# Patient Record
Sex: FEMALE | Race: WHITE | Employment: PART TIME | URBAN - METROPOLITAN AREA
[De-identification: names, ages, dates, MRNs, and addresses within clinical notes are randomized per-mention and may not be internally consistent; named-entity substitution may affect disease eponyms.]

---

## 2019-08-20 ENCOUNTER — HOSPITAL ENCOUNTER (OUTPATIENT)
Dept: ULTRASOUND IMAGING | Age: 29
Discharge: HOME | End: 2019-08-20
Attending: EMERGENCY MEDICINE

## 2019-08-20 ENCOUNTER — HOSPITAL ENCOUNTER (EMERGENCY)
Age: 29
Discharge: HOME | End: 2019-08-21
Attending: EMERGENCY MEDICINE | Admitting: INTERNAL MEDICINE
Payer: SUBSIDIZED

## 2019-08-20 ENCOUNTER — APPOINTMENT (OUTPATIENT)
Dept: ULTRASOUND IMAGING | Age: 29
End: 2019-08-20
Attending: EMERGENCY MEDICINE
Payer: SUBSIDIZED

## 2019-08-20 DIAGNOSIS — N61.1 BREAST ABSCESS: Primary | ICD-10-CM

## 2019-08-20 DIAGNOSIS — N61.0 CELLULITIS OF BREAST: ICD-10-CM

## 2019-08-20 PROBLEM — R65.10 SIRS (SYSTEMIC INFLAMMATORY RESPONSE SYNDROME) (HCC): Status: ACTIVE | Noted: 2019-08-20

## 2019-08-20 LAB
ALBUMIN SERPL-MCNC: 3.4 G/DL (ref 3.5–5)
ALBUMIN/GLOB SERPL: 0.9 {RATIO} (ref 1.1–2.2)
ALP SERPL-CCNC: 105 U/L (ref 45–117)
ALT SERPL-CCNC: 13 U/L (ref 12–78)
ANION GAP SERPL CALC-SCNC: 6 MMOL/L (ref 5–15)
AST SERPL-CCNC: 10 U/L (ref 15–37)
BASOPHILS # BLD: 0.1 K/UL (ref 0–0.1)
BASOPHILS NFR BLD: 0 % (ref 0–1)
BILIRUB SERPL-MCNC: 0.2 MG/DL (ref 0.2–1)
BUN SERPL-MCNC: 19 MG/DL (ref 6–20)
BUN/CREAT SERPL: 24 (ref 12–20)
CALCIUM SERPL-MCNC: 8.7 MG/DL (ref 8.5–10.1)
CHLORIDE SERPL-SCNC: 106 MMOL/L (ref 97–108)
CO2 SERPL-SCNC: 26 MMOL/L (ref 21–32)
COMMENT, HOLDF: NORMAL
COMMENT, HOLDF: NORMAL
CREAT SERPL-MCNC: 0.8 MG/DL (ref 0.55–1.02)
DIFFERENTIAL METHOD BLD: ABNORMAL
EOSINOPHIL # BLD: 0.3 K/UL (ref 0–0.4)
EOSINOPHIL NFR BLD: 2 % (ref 0–7)
ERYTHROCYTE [DISTWIDTH] IN BLOOD BY AUTOMATED COUNT: 12.9 % (ref 11.5–14.5)
GLOBULIN SER CALC-MCNC: 3.9 G/DL (ref 2–4)
GLUCOSE SERPL-MCNC: 103 MG/DL (ref 65–100)
HCT VFR BLD AUTO: 37.1 % (ref 35–47)
HGB BLD-MCNC: 12 G/DL (ref 11.5–16)
IMM GRANULOCYTES # BLD AUTO: 0.1 K/UL (ref 0–0.04)
IMM GRANULOCYTES NFR BLD AUTO: 1 % (ref 0–0.5)
LYMPHOCYTES # BLD: 2 K/UL (ref 0.8–3.5)
LYMPHOCYTES NFR BLD: 14 % (ref 12–49)
MCH RBC QN AUTO: 28.6 PG (ref 26–34)
MCHC RBC AUTO-ENTMCNC: 32.3 G/DL (ref 30–36.5)
MCV RBC AUTO: 88.5 FL (ref 80–99)
MONOCYTES # BLD: 1.1 K/UL (ref 0–1)
MONOCYTES NFR BLD: 8 % (ref 5–13)
NEUTS SEG # BLD: 10.7 K/UL (ref 1.8–8)
NEUTS SEG NFR BLD: 75 % (ref 32–75)
NRBC # BLD: 0 K/UL (ref 0–0.01)
NRBC BLD-RTO: 0 PER 100 WBC
PLATELET # BLD AUTO: 319 K/UL (ref 150–400)
PMV BLD AUTO: 11.3 FL (ref 8.9–12.9)
POTASSIUM SERPL-SCNC: 3.8 MMOL/L (ref 3.5–5.1)
PROT SERPL-MCNC: 7.3 G/DL (ref 6.4–8.2)
RBC # BLD AUTO: 4.19 M/UL (ref 3.8–5.2)
SAMPLES BEING HELD,HOLD: NORMAL
SAMPLES BEING HELD,HOLD: NORMAL
SODIUM SERPL-SCNC: 138 MMOL/L (ref 136–145)
WBC # BLD AUTO: 14.3 K/UL (ref 3.6–11)

## 2019-08-20 PROCEDURE — 76642 ULTRASOUND BREAST LIMITED: CPT

## 2019-08-20 PROCEDURE — 74011250636 HC RX REV CODE- 250/636: Performed by: EMERGENCY MEDICINE

## 2019-08-20 PROCEDURE — 80053 COMPREHEN METABOLIC PANEL: CPT

## 2019-08-20 PROCEDURE — 99284 EMERGENCY DEPT VISIT MOD MDM: CPT

## 2019-08-20 PROCEDURE — 74011250636 HC RX REV CODE- 250/636: Performed by: PHYSICIAN ASSISTANT

## 2019-08-20 PROCEDURE — 96374 THER/PROPH/DIAG INJ IV PUSH: CPT

## 2019-08-20 PROCEDURE — 87186 SC STD MICRODIL/AGAR DIL: CPT

## 2019-08-20 PROCEDURE — 87185 SC STD ENZYME DETCJ PER NZM: CPT

## 2019-08-20 PROCEDURE — 87040 BLOOD CULTURE FOR BACTERIA: CPT

## 2019-08-20 PROCEDURE — 76604 US EXAM CHEST: CPT

## 2019-08-20 PROCEDURE — 85025 COMPLETE CBC W/AUTO DIFF WBC: CPT

## 2019-08-20 PROCEDURE — 87153 DNA/RNA SEQUENCING: CPT

## 2019-08-20 PROCEDURE — 87205 SMEAR GRAM STAIN: CPT

## 2019-08-20 PROCEDURE — 87077 CULTURE AEROBIC IDENTIFY: CPT

## 2019-08-20 PROCEDURE — 74011000258 HC RX REV CODE- 258: Performed by: PHYSICIAN ASSISTANT

## 2019-08-20 PROCEDURE — 36415 COLL VENOUS BLD VENIPUNCTURE: CPT

## 2019-08-20 PROCEDURE — 96375 TX/PRO/DX INJ NEW DRUG ADDON: CPT

## 2019-08-20 PROCEDURE — 83605 ASSAY OF LACTIC ACID: CPT

## 2019-08-20 PROCEDURE — 65270000029 HC RM PRIVATE

## 2019-08-20 RX ORDER — SODIUM CHLORIDE 0.9 % (FLUSH) 0.9 %
5-40 SYRINGE (ML) INJECTION AS NEEDED
Status: DISCONTINUED | OUTPATIENT
Start: 2019-08-20 | End: 2019-08-20

## 2019-08-20 RX ORDER — ONDANSETRON 2 MG/ML
4 INJECTION INTRAMUSCULAR; INTRAVENOUS
Status: COMPLETED | OUTPATIENT
Start: 2019-08-20 | End: 2019-08-20

## 2019-08-20 RX ORDER — MORPHINE SULFATE 4 MG/ML
4 INJECTION INTRAVENOUS
Status: COMPLETED | OUTPATIENT
Start: 2019-08-20 | End: 2019-08-20

## 2019-08-20 RX ORDER — LIDOCAINE HYDROCHLORIDE 20 MG/ML
10 INJECTION, SOLUTION EPIDURAL; INFILTRATION; INTRACAUDAL; PERINEURAL
Status: COMPLETED | OUTPATIENT
Start: 2019-08-20 | End: 2019-08-20

## 2019-08-20 RX ORDER — CLINDAMYCIN HYDROCHLORIDE 300 MG/1
300 CAPSULE ORAL 4 TIMES DAILY
Qty: 28 CAP | Refills: 0 | Status: SHIPPED | OUTPATIENT
Start: 2019-08-20 | End: 2019-08-21 | Stop reason: ALTCHOICE

## 2019-08-20 RX ORDER — ENOXAPARIN SODIUM 100 MG/ML
40 INJECTION SUBCUTANEOUS EVERY 24 HOURS
Status: DISCONTINUED | OUTPATIENT
Start: 2019-08-20 | End: 2019-08-20

## 2019-08-20 RX ORDER — HYDROCODONE BITARTRATE AND ACETAMINOPHEN 5; 325 MG/1; MG/1
1 TABLET ORAL
Qty: 10 TAB | Refills: 0 | Status: SHIPPED | OUTPATIENT
Start: 2019-08-20 | End: 2019-08-23

## 2019-08-20 RX ORDER — ONDANSETRON 2 MG/ML
4 INJECTION INTRAMUSCULAR; INTRAVENOUS
Status: DISCONTINUED | OUTPATIENT
Start: 2019-08-20 | End: 2019-08-20

## 2019-08-20 RX ORDER — NALOXONE HYDROCHLORIDE 0.4 MG/ML
0.4 INJECTION, SOLUTION INTRAMUSCULAR; INTRAVENOUS; SUBCUTANEOUS AS NEEDED
Status: DISCONTINUED | OUTPATIENT
Start: 2019-08-20 | End: 2019-08-20

## 2019-08-20 RX ORDER — SODIUM CHLORIDE 0.9 % (FLUSH) 0.9 %
5-40 SYRINGE (ML) INJECTION EVERY 8 HOURS
Status: DISCONTINUED | OUTPATIENT
Start: 2019-08-20 | End: 2019-08-20

## 2019-08-20 RX ORDER — HYDROCODONE BITARTRATE AND ACETAMINOPHEN 5; 325 MG/1; MG/1
1 TABLET ORAL
Status: DISCONTINUED | OUTPATIENT
Start: 2019-08-20 | End: 2019-08-20

## 2019-08-20 RX ORDER — SODIUM CHLORIDE 9 MG/ML
150 INJECTION, SOLUTION INTRAVENOUS CONTINUOUS
Status: DISCONTINUED | OUTPATIENT
Start: 2019-08-20 | End: 2019-08-20

## 2019-08-20 RX ORDER — ACETAMINOPHEN 325 MG/1
650 TABLET ORAL
Status: DISCONTINUED | OUTPATIENT
Start: 2019-08-20 | End: 2019-08-20

## 2019-08-20 RX ADMIN — LIDOCAINE HYDROCHLORIDE 200 MG: 20 INJECTION, SOLUTION EPIDURAL; INFILTRATION; INTRACAUDAL; PERINEURAL at 22:28

## 2019-08-20 RX ADMIN — MORPHINE SULFATE 4 MG: 4 INJECTION, SOLUTION INTRAMUSCULAR; INTRAVENOUS at 20:28

## 2019-08-20 RX ADMIN — VANCOMYCIN HYDROCHLORIDE 1500 MG: 10 INJECTION, POWDER, LYOPHILIZED, FOR SOLUTION INTRAVENOUS at 23:41

## 2019-08-20 RX ADMIN — AMPICILLIN AND SULBACTAM 3 G: 2; 1 INJECTION, POWDER, FOR SOLUTION INTRAVENOUS at 22:25

## 2019-08-20 RX ADMIN — MORPHINE SULFATE 4 MG: 4 INJECTION, SOLUTION INTRAMUSCULAR; INTRAVENOUS at 22:23

## 2019-08-20 RX ADMIN — ONDANSETRON 4 MG: 2 INJECTION INTRAMUSCULAR; INTRAVENOUS at 20:28

## 2019-08-20 RX ADMIN — SODIUM CHLORIDE 1000 ML: 900 INJECTION, SOLUTION INTRAVENOUS at 20:29

## 2019-08-20 NOTE — ED PROVIDER NOTES
I have evaluated the patient as the Provider in Triage. I have reviewed Her vital signs and the triage nurse assessment. I have talked with the patient and any available family and advised that I am the provider in triage and have ordered the appropriate study to initiate their work up based on the clinical presentation during my assessment. I have advised that the patient will be accommodated in the Main ED as soon as possible. I have also requested to contact the triage nurse or myself immediately if the patient experiences any changes in their condition during this brief waiting period. 34 y.o. female with no significant past medical history who presents from private vehicle with chief complaint of skin problem. Pt reports swelling, pain, redness, warmth, and drainage to the right breast since 08/14/19. Pt notes she is taking left over bactrim that she was prescribed for a previous infection. She reports taking ibuprofen for pain. Pt denies recent breast feeding. Pt denies chills or N/V. There are no other acute medical concerns at this time. Note written by Valentín Khan, as dictated by Asa Dixon MD 5:16 PM        Agree with above HPI  JAQUELINE Oseguera      The history is provided by the patient and a parent. No  was used. No past medical history on file. No past surgical history on file. No family history on file.     Social History     Socioeconomic History    Marital status: SINGLE     Spouse name: Not on file    Number of children: Not on file    Years of education: Not on file    Highest education level: Not on file   Occupational History    Not on file   Social Needs    Financial resource strain: Not on file    Food insecurity:     Worry: Not on file     Inability: Not on file    Transportation needs:     Medical: Not on file     Non-medical: Not on file   Tobacco Use    Smoking status: Not on file   Substance and Sexual Activity    Alcohol use: Not on file    Drug use: Not on file    Sexual activity: Not on file   Lifestyle    Physical activity:     Days per week: Not on file     Minutes per session: Not on file    Stress: Not on file   Relationships    Social connections:     Talks on phone: Not on file     Gets together: Not on file     Attends [employer] service: Not on file     Active member of club or organization: Not on file     Attends meetings of clubs or organizations: Not on file     Relationship status: Not on file    Intimate partner violence:     Fear of current or ex partner: Not on file     Emotionally abused: Not on file     Physically abused: Not on file     Forced sexual activity: Not on file   Other Topics Concern    Not on file   Social History Narrative    Not on file         ALLERGIES: Patient has no known allergies. Review of Systems   Constitutional: Negative for activity change, chills and fever. HENT: Negative for congestion, rhinorrhea and sore throat. Respiratory: Negative for shortness of breath. Cardiovascular: Negative for leg swelling. Gastrointestinal: Negative for abdominal pain, diarrhea, nausea and vomiting. Genitourinary: Negative for dysuria, vaginal bleeding and vaginal discharge. Musculoskeletal: Negative for arthralgias and myalgias. Skin: Positive for color change and wound. Neurological: Negative for dizziness. Psychiatric/Behavioral: Negative for confusion. All other systems reviewed and are negative. There were no vitals filed for this visit. Physical Exam   Constitutional: She is oriented to person, place, and time. She appears well-developed. HENT:   Head: Normocephalic and atraumatic. Right Ear: External ear normal.   Left Ear: External ear normal.   Nose: Nose normal.   Mouth/Throat: Oropharynx is clear and moist. No oropharyngeal exudate. Eyes: Conjunctivae, EOM and lids are normal. Right eye exhibits no discharge. Left eye exhibits no discharge. Neck: Normal range of motion. No tracheal deviation present. No thyromegaly present. Cardiovascular: Normal rate, regular rhythm, normal heart sounds and intact distal pulses. Pulmonary/Chest: Effort normal and breath sounds normal. Right breast exhibits skin change and tenderness. Right breast exhibits no inverted nipple and no nipple discharge. There is breast swelling and tenderness. Abdominal: Soft. Normal appearance and bowel sounds are normal.   Musculoskeletal: Normal range of motion. Neurological: She is alert and oriented to person, place, and time. Skin: Skin is warm and dry. Psychiatric: She has a normal mood and affect. Judgment normal.        MDM  Number of Diagnoses or Management Options  Breast abscess:   Cellulitis of breast:   Diagnosis management comments: Assesment/Plan- 34 y.o. Patient presents with:  Skin Problem  differential includes: abscess, cellulitis, malignancy. Labs and imaging reviewed with leukocytosis, US showing large cystic fluid collection. Patient is well appearing, afebrile and tolerating PO. Recommend breast surgery follow up. Patient educated on reasons to return to the ED. I&D Abcess Complex  Date/Time: 8/23/2019 9:06 AM  Performed by: JAQUELINE Culver  Authorized by: JAQUELINE Culver     Consent:     Consent obtained:  Verbal    Consent given by:  Patient    Risks discussed:  Bleeding, incomplete drainage, pain and infection    Alternatives discussed:  No treatment  Location:     Type:  Abscess    Size:  4cm    Location:  Trunk    Trunk location:  R breast  Pre-procedure details:     Skin preparation:  Antiseptic wash  Anesthesia (see MAR for exact dosages):      Anesthesia method:  Local infiltration    Local anesthetic:  Lidocaine 2% w/o epi  Procedure type:     Complexity:  Complex  Procedure details:     Needle aspiration: yes      Needle size:  25 G    Incision types:  Single straight    Incision depth:  Dermal    Scalpel blade:  11    Wound management:  Probed and deloculated    Drainage:  Purulent    Drainage amount:  Copious    Wound treatment:  Wound left open    Packing materials:  1/2 in gauze    Amount 1/2\":  4  Post-procedure details:     Patient tolerance of procedure: Tolerated well, no immediate complications          CONSULT NOTE:   Grupo Lindquist PA-C spoke with Dr. Jo-Ann Lyons,   Specialty: Breast surgery  Discussed pt's hx, disposition, and available diagnostic and imaging results. Reviewed care plans. Consultant agrees with plans as outlined. Recommended performing an I and D in ED, Discharge home with antibiotics and can be seen in the office in the morning.

## 2019-08-20 NOTE — ED TRIAGE NOTES
Pt presents with redness, swelling, drainage and pain to her right breast since approximately 8/14. Pt also reports fevers.

## 2019-08-21 ENCOUNTER — OFFICE VISIT (OUTPATIENT)
Dept: SURGERY | Age: 29
End: 2019-08-21

## 2019-08-21 ENCOUNTER — DOCUMENTATION ONLY (OUTPATIENT)
Dept: SURGERY | Age: 29
End: 2019-08-21

## 2019-08-21 VITALS
HEART RATE: 94 BPM | WEIGHT: 140 LBS | BODY MASS INDEX: 23.9 KG/M2 | TEMPERATURE: 97.9 F | HEIGHT: 64 IN | SYSTOLIC BLOOD PRESSURE: 117 MMHG | DIASTOLIC BLOOD PRESSURE: 75 MMHG | RESPIRATION RATE: 18 BRPM | OXYGEN SATURATION: 96 %

## 2019-08-21 VITALS
BODY MASS INDEX: 23.9 KG/M2 | HEART RATE: 86 BPM | HEIGHT: 64 IN | WEIGHT: 140 LBS | DIASTOLIC BLOOD PRESSURE: 37 MMHG | SYSTOLIC BLOOD PRESSURE: 105 MMHG

## 2019-08-21 DIAGNOSIS — N61.1 ABSCESS OF BREAST, RIGHT: Primary | ICD-10-CM

## 2019-08-21 LAB — LACTATE BLD-SCNC: 0.68 MMOL/L (ref 0.4–2)

## 2019-08-21 RX ORDER — AMOXICILLIN AND CLAVULANATE POTASSIUM 875; 125 MG/1; MG/1
1 TABLET, FILM COATED ORAL 2 TIMES DAILY
Qty: 28 TAB | Refills: 1 | Status: SHIPPED | OUTPATIENT
Start: 2019-08-21 | End: 2022-06-02

## 2019-08-21 NOTE — PROGRESS NOTES
[facility]  OFFICE PROCEDURE PROGRESS NOTE        Chart reviewed for the following:   Cat Rogers MD, have reviewed the History, Physical and updated the Allergic reactions for 1990 [facility] performed immediately prior to start of procedure:   Cat Rogers MD, have performed the following reviews on [facility] prior to the start of the procedure:            * Patient was identified by name and date of birth   * Agreement on procedure being performed was verified  * Risks and Benefits explained to the patient  * Procedure site verified and marked as necessary  * Patient was positioned for comfort  * Consent was signed and verified     Time:  10:50 am      Date of procedure: 8/21/2019    Procedure performed by:  Darron Carmen MD    Provider assisted by:  Jay Morrow RN    Patient assisted by: self    How tolerated by patient: tolerated the procedure well with no complications    Post Procedural Pain Scale: 2 - Hurts Little Bit    Comments: none

## 2019-08-21 NOTE — PROGRESS NOTES
HISTORY OF PRESENT ILLNESS  Abi Ivan is a 34 y.o. female. HPI ESTABLISHED patient here for follow-up of RIGHT breast abscess. She was seen in ED on 6/10/21 and by Dr. Farhat Bernard on 7/69/94. She did have packing change yesterday. Breast was reddened where tape was and used a stretchy mesh to keep ABD pad on, and redness from tape much improved. She states it it more tender than yesterday and drainage has decreased. There is still redness, swelling and some warmth under the under the breast. On day 2 of antibiotics. Does have to take hydrocodone for pain occasionally. Menarche 16, LMP 8/19/19, # of children 0, age of 1st delivery N/A, Hysterectomy/oophorectomy No/No  Breast bx No, history of breast feeding No, BCP Yes, in the past Hormone therapy No    US Results (most recent):  Results from Hospital Encounter encounter on 08/20/19   US BREAST RT LIMITED=<3 QUAD    Narrative STUDY:  Right breast ultrasound    INDICATION: Swelling, pain, redness, warmth, and drainage to the right breast  for one week    COMPARISON:  None    FINDINGS: Right breast ultrasound was performed, while the patient was in the  emergency department. There is diffuse skin thickening. Immediately deep to the  skin, there is a complex cystic mass, which measures 5.7 x 5.4 x 3.1 cm, and  appears to extend to the skin surface. There is no definite internal  vascularity. Per the technologist, this is located at 11:00, 3 cm from the  nipple. Impression IMPRESSION:    1. Complex cystic mass in the superficial aspect of the right breast at 11:00,  measuring 5.7 x 5.4 x 3.1 cm. This may represent a breast abscess in the  appropriate clinical scenario. Other etiologies to consider would include a  necrotic breast cancer. Continued antibiotic coverage is recommended. Outpatient  breast surgical consultation is recommended. Follow-up in a dedicated breast  center as an outpatient is recommended.          ROS    Physical Exam Pulmonary/Chest:         Visit Vitals  /60   Pulse 60   Ht 5' 4\" (1.626 m)   Wt 140 lb (63.5 kg)   LMP 08/19/2019 (Exact Date)   BMI 24.03 kg/m²     ASSESSMENT and PLAN  Encounter Diagnoses   Name Primary?  Abscess of breast, right Yes     Packing changed without issue. Use saline to loosen gauze dressing. Patient had a reaction to tape and her whole breast was red, but this is improving. Mesh band used to whole bandage in place. Abscess healing, but skin immediately surrounding incision is thin and friable. Used nonstick pad for dressing and the guaze was sticking and pulling off skin. Follow-up on Monday for packing change - patient is unsure if she could change packing herself at home. Continue on antibiotics. Encouraged smoking cessation. RTC on Monday for packing change. After that should follow-up on Wednesday for packing change (unless she can change the packing at home) and then with Dr. Nakia Donato on Friday. She is comfortable with this plan. All questions answered and she stated understanding.

## 2019-08-21 NOTE — PROGRESS NOTES
HISTORY OF PRESENT ILLNESS  Toshia Guzman is a 34 y.o. female. HPI   NEW patient presents for consultation at the request of [facility] ER for RIGHT breast abscess. Was seen there last night and had an I and D and was given IV Vancomycin. Was discharged on pain medication and Dicloxacillin, which she has not started yet. She says that she has had a skin lesion on her breast for about 10 years which flares up and drains thick white material occasionally. This resolves and she is left with a lesion that looks a lot like a pimple with a white head. This pattern has repeated itself for many years. Last week she noticed swelling, redness and pain of the RIGHT breast along with thick white drainage. Her mom gave her some leftover Bactrim, but it did not improve. Presented to the ER last night after she got considerably worse. There is no FH of breast or ovarian cancer. US Results (most recent):  Results from [place] encounter on 08/20/19   US BREAST RT LIMITED=<3 QUAD    Narrative STUDY:  Right breast ultrasound    INDICATION: Swelling, pain, redness, warmth, and drainage to the right breast  for one week    COMPARISON:  None    FINDINGS: Right breast ultrasound was performed, while the patient was in the  emergency department. There is diffuse skin thickening. Immediately deep to the  skin, there is a complex cystic mass, which measures 5.7 x 5.4 x 3.1 cm, and  appears to extend to the skin surface. There is no definite internal  vascularity. Per the technologist, this is located at 11:00, 3 cm from the  nipple. Impression IMPRESSION:    1. Complex cystic mass in the superficial aspect of the right breast at 11:00,  measuring 5.7 x 5.4 x 3.1 cm. This may represent a breast abscess in the  appropriate clinical scenario. Other etiologies to consider would include a  necrotic breast cancer. Continued antibiotic coverage is recommended.  Outpatient  breast surgical consultation is recommended. Follow-up in a dedicated breast  center as an outpatient is recommended. Review of Systems   Constitutional: Negative. HENT: Negative. Eyes: Negative. Respiratory: Negative. Cardiovascular: Negative. Gastrointestinal: Positive for constipation. Genitourinary: Negative. Musculoskeletal: Negative. Skin: Negative. Neurological: Negative. Endo/Heme/Allergies: Negative. Psychiatric/Behavioral: The patient is nervous/anxious.         Physical Exam    ASSESSMENT and PLAN  {ASSESSMENT/PLAN:44031}

## 2019-08-21 NOTE — PROGRESS NOTES
HISTORY OF PRESENT ILLNESS  Le Thomas is a 34 y.o. female. HPI  NEW patient presents for consultation at the request of [facility] ER for RIGHT breast abscess. Was seen there last night and had an I and D and was given IV Vancomycin. Was discharged on pain medication and Dicloxacillin, which she has not started yet. She says that she has had a skin lesion on her breast for about 10 years which flares up and drains thick white material occasionally. This resolves and she is left with a lesion that looks a lot like a pimple with a white head. This pattern has repeated itself for many years. Last week she noticed swelling, redness and pain of the RIGHT breast along with thick white drainage. Her mom gave her some leftover Bactrim, but it did not improve. Presented to the ER last night after she got considerably worse. There is no FH of breast or ovarian cancer. US Results (most recent):       Results from [place] encounter on 08/20/19   US BREAST RT LIMITED=<3 QUAD     Narrative STUDY:  Right breast ultrasound     INDICATION: Swelling, pain, redness, warmth, and drainage to the right breast  for one week     COMPARISON:  None     FINDINGS: Right breast ultrasound was performed, while the patient was in the  emergency department. There is diffuse skin thickening. Immediately deep to the  skin, there is a complex cystic mass, which measures 5.7 x 5.4 x 3.1 cm, and  appears to extend to the skin surface. There is no definite internal  vascularity. Per the technologist, this is located at 11:00, 3 cm from the  nipple.        Impression IMPRESSION:     1. Complex cystic mass in the superficial aspect of the right breast at 11:00,  measuring 5.7 x 5.4 x 3.1 cm. This may represent a breast abscess in the  appropriate clinical scenario. Other etiologies to consider would include a  necrotic breast cancer. Continued antibiotic coverage is recommended.  Outpatient  breast surgical consultation is recommended. Follow-up in a dedicated breast  center as an outpatient is recommended.           No past medical history on file. No past surgical history on file. Social History     Socioeconomic History    Marital status: SINGLE     Spouse name: Not on file    Number of children: Not on file    Years of education: Not on file    Highest education level: Not on file   Occupational History    Not on file   Social Needs    Financial resource strain: Not on file    Food insecurity:     Worry: Not on file     Inability: Not on file    Transportation needs:     Medical: Not on file     Non-medical: Not on file   Tobacco Use    Smoking status: Current Every Day Smoker     Packs/day: 1.00    Smokeless tobacco: Never Used   Substance and Sexual Activity    Alcohol use: Yes     Alcohol/week: 6.0 standard drinks     Types: 6 Cans of beer per week    Drug use: Not on file    Sexual activity: Not on file   Lifestyle    Physical activity:     Days per week: Not on file     Minutes per session: Not on file    Stress: Not on file   Relationships    Social connections:     Talks on phone: Not on file     Gets together: Not on file     Attends Jew service: Not on file     Active member of club or organization: Not on file     Attends meetings of clubs or organizations: Not on file     Relationship status: Not on file    Intimate partner violence:     Fear of current or ex partner: Not on file     Emotionally abused: Not on file     Physically abused: Not on file     Forced sexual activity: Not on file   Other Topics Concern    Not on file   Social History Narrative    Not on file       Current Outpatient Medications on File Prior to Visit   Medication Sig Dispense Refill    HYDROcodone-acetaminophen (NORCO) 5-325 mg per tablet Take 1 Tab by mouth every four (4) hours as needed for Pain for up to 3 days. Max Daily Amount: 6 Tabs.  10 Tab 0     Current Facility-Administered Medications on File Prior to Visit   Medication Dose Route Frequency Provider Last Rate Last Dose    [COMPLETED] sodium chloride 0.9 % bolus infusion 1,000 mL  1,000 mL IntraVENous ONCE Rin Barrow MD   Stopped at 08/21/19 0412    [COMPLETED] morphine injection 4 mg  4 mg IntraVENous NOW Kamar Lindquist PA   4 mg at 08/20/19 2028    [COMPLETED] ondansetron (ZOFRAN) injection 4 mg  4 mg IntraVENous NOW Kamar Lindquist PA   4 mg at 08/20/19 2028    [COMPLETED] vancomycin (VANCOCIN) 1,500 mg in 0.9% sodium chloride 500 mL IVPB  1,500 mg IntraVENous ONCE Dev Lindquist [place]   Stopped at 08/21/19 0111    [COMPLETED] lidocaine (PF) (XYLOCAINE) 20 mg/mL (2 %) injection 200 mg  10 mL IntraDERMal NOW Kamar Lindquist PA   200 mg at 08/20/19 2228    [COMPLETED] ampicillin-sulbactam (UNASYN) 3 g in 0.9% sodium chloride (MBP/ADV) 100 mL  3 g IntraVENous NOW JAQUELINE Hilton   Stopped at 08/21/19 0412    [COMPLETED] morphine injection 4 mg  4 mg IntraVENous NOW Kamar Lindquist PA   4 mg at 08/20/19 2223    [DISCONTINUED] ampicillin-sulbactam (UNASYN) 3 g in 0.9% sodium chloride (MBP/ADV) 100 mL  3 g IntraVENous Q8H Huey Jerry MD        [DISCONTINUED] 0.9% sodium chloride infusion  150 mL/hr IntraVENous CONTINUOUS Huey Jerry MD        [DISCONTINUED] sodium chloride (NS) flush 5-40 mL  5-40 mL IntraVENous Q8H Huey Jerry MD        [DISCONTINUED] sodium chloride (NS) flush 5-40 mL  5-40 mL IntraVENous PRN Huey Jerry MD        [DISCONTINUED] acetaminophen (TYLENOL) tablet 650 mg  650 mg Oral Q4H PRN Huey Jerry MD        [DISCONTINUED] HYDROcodone-acetaminophen (NORCO) 5-325 mg per tablet 1 Tab  1 Tab Oral Q4H PRN Huey Jerry MD        [DISCONTINUED] naloxone [facility]) injection 0.4 mg  0.4 mg IntraVENous PRN Ramesh Rose MD        [DISCONTINUED] ondansetron [facility]) injection 4 mg  4 mg IntraVENous Q4H PRN Ramesh Rose MD        [DISCONTINUED] enoxaparin (LOVENOX) injection 40 mg  40 mg SubCUTAneous Q24H Dobransky, Nehemiah Cogan, MD        [DISCONTINUED] ADDaptor                No Known Allergies    OB History    None      Obstetric Comments   Menarche 16, LMP 8/19/19, # of children 0, age of 1st delivery N/A, Hysterectomy/oophorectomy No/No  Breast bx No, history of breast feeding No, BCP Yes, in the past Hormone therapy No               ROS  Constitutional: Negative. HENT: Negative. Eyes: Negative. Respiratory: Negative. Cardiovascular: Negative. Gastrointestinal: Positive for constipation. Genitourinary: Negative. Musculoskeletal: Negative. Skin: Negative. Neurological: Negative. Endo/Heme/Allergies: Negative. Psychiatric/Behavioral: The patient is nervous/anxious. Physical Exam   Cardiovascular: Normal rate, regular rhythm and normal heart sounds. Pulmonary/Chest: Breath sounds normal. Right breast exhibits no inverted nipple, no mass, no nipple discharge, no skin change and no tenderness. Left breast exhibits no inverted nipple, no mass, no nipple discharge, no skin change and no tenderness. Breasts are symmetrical.       Lymphadenopathy:     She has no cervical adenopathy. Right cervical: No superficial cervical, no deep cervical and no posterior cervical adenopathy present. Left cervical: No superficial cervical, no deep cervical and no posterior cervical adenopathy present. She has no axillary adenopathy. Right axillary: No pectoral and no lateral adenopathy present. Left axillary: No pectoral and no lateral adenopathy present. I & D  Indication: RIGHT breast abscess  Prep: We cleaned the skin with alcohol. Anesthesia: We anesthetized with Xylocaine. Guidance: We used real-time ultrasound guidance. Technique: We made a nicking incision in the skin. Yield: This yielded 10cc of pus. Result: This substantially decompressed the swelling.   Dressing: Clean, dry dressing applied. Tolerance: The patient tolerated the procedure well, pain was decreased at the completion of the procedure. Disposition: The patient was sent home in stable condition. Follow up as noted in the Plan and Next Appointment. ASSESSMENT and PLAN    ICD-10-CM ICD-9-CM    1. Abscess of breast, right N61.1 611.0       New patient presents for evaluation of RIGHT breast abscess, and is doing well overall. Large retroareolar breast abscess, with necrotic area at lateral portion of areola. Discussed I&D and debridement, and pt agreed to proceed. Debrided and drained 10cc of pus. Moderate amount of necrotic tissue at base of abscess cavity. Packed by RN. Pt tolerated these procedures well. Recommend daily wound care and antibiotics. Prescription given for Augmentin. (Pt was given prescriptions at her ED visit for Hydrocodone and Clindamycin; she may fill her prescription for hydrocodone, but does not need to fill the Clindamycin.)    F/U with RN tomorrow, and with Loli Rios NP on Friday to re-pack. Pt should continue daily packing, and f/u with me on Friday 08/30. This plan was reviewed with the patient and patient agrees. All questions were answered.     Written by Janette Logan, as dictated by Dr. Zuleyka Clark MD.

## 2019-08-21 NOTE — PROGRESS NOTES
JAQUELINE Brooks, consulted pharmacy for Vancomycin dosing. I ordered 1500 mg IV dose to be given now, per protocol. Treating breast abscess, along with Unasyn. Pharmacy will follow for further orders.

## 2019-08-21 NOTE — LETTER
8/22/2019 4:22 PM 
 
Patient:  Javier Heard YOB: 1990 Date of Visit: 8/21/2019 Dear Dr. Anup Uriostegui: Thank you for referring Ms. Salma Gaston to me for evaluation/treatment. Below are the relevant portions of my assessment and plan of care. HISTORY OF PRESENT ILLNESS Javier Heard is a 34 y.o. female. HPI 
NEW patient presents for consultation at the request of [facility] for RIGHT breast abscess. Was seen there last night and had an I and D and was given IV Vancomycin. Was discharged on pain medication and Dicloxacillin, which she has not started yet. She says that she has had a skin lesion on her breast for about 10 years which flares up and drains thick white material occasionally. This resolves and she is left with a lesion that looks a lot like a pimple with a white head. This pattern has repeated itself for many years. Last week she noticed swelling, redness and pain of the RIGHT breast along with thick white drainage. Her mom gave her some leftover Bactrim, but it did not improve. Presented to the ER last night after she got considerably worse. There is no FH of breast or ovarian cancer. US Results (most recent): 
    
Results from [place] encounter on 08/20/19 US BREAST RT LIMITED=<3 QUAD  
  Narrative STUDY:  Right breast ultrasound 
  INDICATION: Swelling, pain, redness, warmth, and drainage to the right breast 
for one week 
  
COMPARISON:  None 
  
FINDINGS: Right breast ultrasound was performed, while the patient was in the 
emergency department. There is diffuse skin thickening. Immediately deep to the 
skin, there is a complex cystic mass, which measures 5.7 x 5.4 x 3.1 cm, and 
appears to extend to the skin surface. There is no definite internal 
vascularity.  Per the technologist, this is located at 11:00, 3 cm from the 
nipple. 
   
  Impression IMPRESSION: 
  
 1. Complex cystic mass in the superficial aspect of the right breast at 11:00, 
measuring 5.7 x 5.4 x 3.1 cm. This may represent a breast abscess in the 
appropriate clinical scenario. Other etiologies to consider would include a 
necrotic breast cancer. Continued antibiotic coverage is recommended. Outpatient 
breast surgical consultation is recommended. Follow-up in a dedicated breast 
center as an outpatient is recommended. 
   
  
 
No past medical history on file. No past surgical history on file. Social History Socioeconomic History  Marital status: SINGLE Spouse name: Not on file  Number of children: Not on file  Years of education: Not on file  Highest education level: Not on file Occupational History  Not on file Social Needs  Financial resource strain: Not on file  Food insecurity:  
  Worry: Not on file Inability: Not on file  Transportation needs:  
  Medical: Not on file Non-medical: Not on file Tobacco Use  Smoking status: Current Every Day Smoker Packs/day: 1.00  Smokeless tobacco: Never Used Substance and Sexual Activity  Alcohol use: Yes Alcohol/week: 6.0 standard drinks Types: 6 Cans of beer per week  Drug use: Not on file  Sexual activity: Not on file Lifestyle  Physical activity:  
  Days per week: Not on file Minutes per session: Not on file  Stress: Not on file Relationships  Social connections:  
  Talks on phone: Not on file Gets together: Not on file Attends Mu-ism service: Not on file Active member of club or organization: Not on file Attends meetings of clubs or organizations: Not on file Relationship status: Not on file  Intimate partner violence:  
  Fear of current or ex partner: Not on file Emotionally abused: Not on file Physically abused: Not on file Forced sexual activity: Not on file Other Topics Concern  Not on file Social History Narrative  Not on file Current Outpatient Medications on File Prior to Visit Medication Sig Dispense Refill  
 HYDROcodone-acetaminophen (NORCO) 5-325 mg per tablet Take 1 Tab by mouth every four (4) hours as needed for Pain for up to 3 days. Max Daily Amount: 6 Tabs. 10 Tab 0 Current Facility-Administered Medications on File Prior to Visit Medication Dose Route Frequency Provider Last Rate Last Dose  [COMPLETED] sodium chloride 0.9 % bolus infusion 1,000 mL  1,000 mL IntraVENous ONCE Yvonne Branham MD   Stopped at 08/21/19 2753  [COMPLETED] morphine injection 4 mg  4 mg IntraVENous NOW Kamar Lindquist PA   4 mg at 08/20/19 2028  [COMPLETED] ondansetron (ZOFRAN) injection 4 mg  4 mg IntraVENous NOW Kamar Lindquist PA   4 mg at 08/20/19 2028  [COMPLETED] vancomycin (VANCOCIN) 1,500 mg in 0.9% sodium chloride 500 mL IVPB  1,500 mg IntraVENous ONCE Kamar Lindquist [address]   Stopped at 08/21/19 0111  
 [COMPLETED] lidocaine (PF) (XYLOCAINE) 20 mg/mL (2 %) injection 200 mg  10 mL IntraDERMal NOW Kamar Lindquist PA   200 mg at 08/20/19 2228  [COMPLETED] ampicillin-sulbactam (UNASYN) 3 g in 0.9% sodium chloride (MBP/ADV) 100 mL  3 g IntraVENous NOW Felipe Fritz, [address]   Stopped at 08/21/19 3789  [COMPLETED] morphine injection 4 mg  4 mg IntraVENous NOW Kamar Lindquist PA   4 mg at 08/20/19 2223  [DISCONTINUED] ampicillin-sulbactam (UNASYN) 3 g in 0.9% sodium chloride (MBP/ADV) 100 mL  3 g IntraVENous Q8H Char Jerry MD      
 [DISCONTINUED] 0.9% sodium chloride infusion  150 mL/hr IntraVENous CONTINUOUS Char Jerry MD      
 [DISCONTINUED] sodium chloride (NS) flush 5-40 mL  5-40 mL IntraVENous Q8H Char Jerry MD      
 [DISCONTINUED] sodium chloride (NS) flush 5-40 mL  5-40 mL IntraVENous PRN DoChar lanier MD      
 [DISCONTINUED] acetaminophen (TYLENOL) tablet 650 mg  650 mg Oral Q4H PRN Edison Muniz MD      
  [DISCONTINUED] HYDROcodone-acetaminophen (NORCO) 5-325 mg per tablet 1 Tab  1 Tab Oral Q4H PRN Brooke Jerry MD      
 [DISCONTINUED] naloxone [facility]) injection 0.4 mg  0.4 mg IntraVENous PRN Brooke Jerry MD      
 [DISCONTINUED] ondansetron [facility]) injection 4 mg  4 mg IntraVENous Q4H PRN Brooke Jerry MD      
 [DISCONTINUED] enoxaparin (LOVENOX) injection 40 mg  40 mg SubCUTAneous Q24H Brooke Jerry MD      
[facility] [DISCONTINUED] ADDaptor No Known Allergies OB History None Obstetric Comments Menarche 16, LMP 8/19/19, # of children 0, age of 1st delivery N/A, Hysterectomy/oophorectomy No/No  Breast bx No, history of breast feeding No, BCP Yes, in the past Hormone therapy No 
  
  
  
 
 
ROS Constitutional: Negative. HENT: Negative. Eyes: Negative. Respiratory: Negative. Cardiovascular: Negative. Gastrointestinal: Positive for constipation. Genitourinary: Negative. Musculoskeletal: Negative. Skin: Negative. Neurological: Negative. Endo/Heme/Allergies: Negative. Psychiatric/Behavioral: The patient is nervous/anxious. Physical Exam  
Cardiovascular: Normal rate, regular rhythm and normal heart sounds. Pulmonary/Chest: Breath sounds normal. Right breast exhibits no inverted nipple, no mass, no nipple discharge, no skin change and no tenderness. Left breast exhibits no inverted nipple, no mass, no nipple discharge, no skin change and no tenderness. Breasts are symmetrical.  
 
 
Lymphadenopathy:  
  She has no cervical adenopathy. Right cervical: No superficial cervical, no deep cervical and no posterior cervical adenopathy present. Left cervical: No superficial cervical, no deep cervical and no posterior cervical adenopathy present. She has no axillary adenopathy. Right axillary: No pectoral and no lateral adenopathy present. Left axillary: No pectoral and no lateral adenopathy present. I & D Indication: RIGHT breast abscess Prep: We cleaned the skin with alcohol. Anesthesia: We anesthetized with Xylocaine. Guidance: We used real-time ultrasound guidance. Technique: We made a nicking incision in the skin. Yield: This yielded 10cc of pus. Result: This substantially decompressed the swelling. Dressing: Clean, dry dressing applied. Tolerance: The patient tolerated the procedure well, pain was decreased at the completion of the procedure. Disposition: The patient was sent home in stable condition. Follow up as noted in the Plan and Next Appointment. ASSESSMENT and PLAN 
  ICD-10-CM ICD-9-CM 1. Abscess of breast, right N61.1 611.0 New patient presents for evaluation of RIGHT breast abscess, and is doing well overall. Large retroareolar breast abscess, with necrotic area at lateral portion of areola. Discussed I&D and debridement, and pt agreed to proceed. Debrided and drained 10cc of pus. Moderate amount of necrotic tissue at base of abscess cavity. Packed by RN. Pt tolerated these procedures well. Recommend daily wound care and antibiotics. Prescription given for Augmentin. (Pt was given prescriptions at her ED visit for Hydrocodone and Clindamycin; she may fill her prescription for hydrocodone, but does not need to fill the Clindamycin.) F/U with RN tomorrow, and with Effie Ramos NP on Friday to re-pack. Pt should continue daily packing, and f/u with me on Friday 08/30. This plan was reviewed with the patient and patient agrees. All questions were answered. Written by Balbina Burton, as dictated by Dr. Sherrie Waller MD. 
 
If you have questions, please do not hesitate to call me. I look forward to following Ms. Long along with you.  
 
 
 
Sincerely, 
 
 
Amadou Hewitt MD

## 2019-08-22 ENCOUNTER — CLINICAL SUPPORT (OUTPATIENT)
Dept: SURGERY | Age: 29
End: 2019-08-22

## 2019-08-22 VITALS
HEART RATE: 78 BPM | HEIGHT: 64 IN | WEIGHT: 140 LBS | DIASTOLIC BLOOD PRESSURE: 74 MMHG | SYSTOLIC BLOOD PRESSURE: 138 MMHG | BODY MASS INDEX: 23.9 KG/M2

## 2019-08-22 DIAGNOSIS — N61.1 BREAST ABSCESS: ICD-10-CM

## 2019-08-22 NOTE — PROGRESS NOTES
HISTORY OF PRESENT ILLNESS  Anil Naik is a 34 y.o. female. HPI ESTABLISHED patient here today for packing change for abscess of the RIGHT breast.    ROS   Not completed    Physical Exam   Not completed    ASSESSMENT and PLAN  Packing removed and sterile saline used to lossen the dressing and packing. New packing placed and the wound was dressed and mesh wrap given to patient to use instead of tape.

## 2019-08-22 NOTE — COMMUNICATION BODY
HISTORY OF PRESENT ILLNESS  Nasrin Laurent is a 34 y.o. female. HPI  NEW patient presents for consultation at the request of [address] [place] [address] ER for RIGHT breast abscess. Was seen there last night and had an I and D and was given IV Vancomycin. Was discharged on pain medication and Dicloxacillin, which she has not started yet. She says that she has had a skin lesion on her breast for about 10 years which flares up and drains thick white material occasionally. This resolves and she is left with a lesion that looks a lot like a pimple with a white head. This pattern has repeated itself for many years. Last week she noticed swelling, redness and pain of the RIGHT breast along with thick white drainage. Her mom gave her some leftover Bactrim, but it did not improve. Presented to the ER last night after she got considerably worse. There is no FH of breast or ovarian cancer. US Results (most recent):       Results from [place] encounter on 08/20/19   US BREAST RT LIMITED=<3 QUAD     Narrative STUDY:  Right breast ultrasound     INDICATION: Swelling, pain, redness, warmth, and drainage to the right breast  for one week     COMPARISON:  None     FINDINGS: Right breast ultrasound was performed, while the patient was in the  emergency department. There is diffuse skin thickening. Immediately deep to the  skin, there is a complex cystic mass, which measures 5.7 x 5.4 x 3.1 cm, and  appears to extend to the skin surface. There is no definite internal  vascularity. Per the technologist, this is located at 11:00, 3 cm from the  nipple.        Impression IMPRESSION:     1. Complex cystic mass in the superficial aspect of the right breast at 11:00,  measuring 5.7 x 5.4 x 3.1 cm. This may represent a breast abscess in the  appropriate clinical scenario. Other etiologies to consider would include a  necrotic breast cancer. Continued antibiotic coverage is recommended.  Outpatient  breast surgical consultation is recommended. Follow-up in a dedicated breast  center as an outpatient is recommended.           No past medical history on file. No past surgical history on file. Social History     Socioeconomic History    Marital status: SINGLE     Spouse name: Not on file    Number of children: Not on file    Years of education: Not on file    Highest education level: Not on file   Occupational History    Not on file   Social Needs    Financial resource strain: Not on file    Food insecurity:     Worry: Not on file     Inability: Not on file    Transportation needs:     Medical: Not on file     Non-medical: Not on file   Tobacco Use    Smoking status: Current Every Day Smoker     Packs/day: 1.00    Smokeless tobacco: Never Used   Substance and Sexual Activity    Alcohol use: Yes     Alcohol/week: 6.0 standard drinks     Types: 6 Cans of beer per week    Drug use: Not on file    Sexual activity: Not on file   Lifestyle    Physical activity:     Days per week: Not on file     Minutes per session: Not on file    Stress: Not on file   Relationships    Social connections:     Talks on phone: Not on file     Gets together: Not on file     Attends Zoroastrian service: Not on file     Active member of club or organization: Not on file     Attends meetings of clubs or organizations: Not on file     Relationship status: Not on file    Intimate partner violence:     Fear of current or ex partner: Not on file     Emotionally abused: Not on file     Physically abused: Not on file     Forced sexual activity: Not on file   Other Topics Concern    Not on file   Social History Narrative    Not on file       Current Outpatient Medications on File Prior to Visit   Medication Sig Dispense Refill    HYDROcodone-acetaminophen (NORCO) 5-325 mg per tablet Take 1 Tab by mouth every four (4) hours as needed for Pain for up to 3 days. Max Daily Amount: 6 Tabs.  10 Tab 0     Current Facility-Administered Medications on File Prior to Visit   Medication Dose Route Frequency Provider Last Rate Last Dose    [COMPLETED] sodium chloride 0.9 % bolus infusion 1,000 mL  1,000 mL IntraVENous ONCE Adelaide Blanco MD   Stopped at 08/21/19 0412    [COMPLETED] morphine injection 4 mg  4 mg IntraVENous NOW Kamar Lindquist PA   4 mg at 08/20/19 2028    [COMPLETED] ondansetron (ZOFRAN) injection 4 mg  4 mg IntraVENous NOW Kamar Lindquist PA   4 mg at 08/20/19 2028    [COMPLETED] vancomycin (VANCOCIN) 1,500 mg in 0.9% sodium chloride 500 mL IVPB  1,500 mg IntraVENous ONCE Ameena Tylene Chi, [address]   Stopped at 08/21/19 0111    [COMPLETED] lidocaine (PF) (XYLOCAINE) 20 mg/mL (2 %) injection 200 mg  10 mL IntraDERMal NOW Kamar Lindquist PA   200 mg at 08/20/19 2228    [COMPLETED] ampicillin-sulbactam (UNASYN) 3 g in 0.9% sodium chloride (MBP/ADV) 100 mL  3 g IntraVENous NOW JAQUELINE Farias   Stopped at 08/21/19 0412    [COMPLETED] morphine injection 4 mg  4 mg IntraVENous NOW Kamar Lindquist PA   4 mg at 08/20/19 2223    [DISCONTINUED] ampicillin-sulbactam (UNASYN) 3 g in 0.9% sodium chloride (MBP/ADV) 100 mL  3 g IntraVENous Q8H Leti Jerry MD        [DISCONTINUED] 0.9% sodium chloride infusion  150 mL/hr IntraVENous CONTINUOUS Leti Jerry MD        [DISCONTINUED] sodium chloride (NS) flush 5-40 mL  5-40 mL IntraVENous Q8H Leit Jerry MD        [DISCONTINUED] sodium chloride (NS) flush 5-40 mL  5-40 mL IntraVENous PRN Leti Jerry MD        [DISCONTINUED] acetaminophen (TYLENOL) tablet 650 mg  650 mg Oral Q4H PRN Leti Jerry MD        [DISCONTINUED] HYDROcodone-acetaminophen (NORCO) 5-325 mg per tablet 1 Tab  1 Tab Oral Q4H PRN Leti Jerry MD        [DISCONTINUED] naloxone [facility]) injection 0.4 mg  0.4 mg IntraVENous PRN Mazin Rossi MD        [DISCONTINUED] ondansetron [facility]) injection 4 mg  4 mg IntraVENous Q4H PRN Mazin Rossi MD        [DISCONTINUED] enoxaparin (LOVENOX) injection 40 mg  40 mg SubCUTAneous Q24H Regino Jerry MD        [DISCONTINUED] ADDaptor                No Known Allergies    OB History    None      Obstetric Comments   Menarche 16, LMP 8/19/19, # of children 0, age of 1st delivery N/A, Hysterectomy/oophorectomy No/No  Breast bx No, history of breast feeding No, BCP Yes, in the past Hormone therapy No               ROS  Constitutional: Negative. HENT: Negative. Eyes: Negative. Respiratory: Negative. Cardiovascular: Negative. Gastrointestinal: Positive for constipation. Genitourinary: Negative. Musculoskeletal: Negative. Skin: Negative. Neurological: Negative. Endo/Heme/Allergies: Negative. Psychiatric/Behavioral: The patient is nervous/anxious. Physical Exam   Cardiovascular: Normal rate, regular rhythm and normal heart sounds. Pulmonary/Chest: Breath sounds normal. Right breast exhibits no inverted nipple, no mass, no nipple discharge, no skin change and no tenderness. Left breast exhibits no inverted nipple, no mass, no nipple discharge, no skin change and no tenderness. Breasts are symmetrical.       Lymphadenopathy:     She has no cervical adenopathy. Right cervical: No superficial cervical, no deep cervical and no posterior cervical adenopathy present. Left cervical: No superficial cervical, no deep cervical and no posterior cervical adenopathy present. She has no axillary adenopathy. Right axillary: No pectoral and no lateral adenopathy present. Left axillary: No pectoral and no lateral adenopathy present. I & D  Indication: RIGHT breast abscess  Prep: We cleaned the skin with alcohol. Anesthesia: We anesthetized with Xylocaine. Guidance: We used real-time ultrasound guidance. Technique: We made a nicking incision in the skin. Yield: This yielded 10cc of pus. Result: This substantially decompressed the swelling.   Dressing: Clean, dry dressing applied. Tolerance: The patient tolerated the procedure well, pain was decreased at the completion of the procedure. Disposition: The patient was sent home in stable condition. Follow up as noted in the Plan and Next Appointment. ASSESSMENT and PLAN    ICD-10-CM ICD-9-CM    1. Abscess of breast, right N61.1 611.0       New patient presents for evaluation of RIGHT breast abscess, and is doing well overall. Large retroareolar breast abscess, with necrotic area at lateral portion of areola. Discussed I&D and debridement, and pt agreed to proceed. Debrided and drained 10cc of pus. Moderate amount of necrotic tissue at base of abscess cavity. Packed by RN. Pt tolerated these procedures well. Recommend daily wound care and antibiotics. Prescription given for Augmentin. (Pt was given prescriptions at her ED visit for Hydrocodone and Clindamycin; she may fill her prescription for hydrocodone, but does not need to fill the Clindamycin.)    F/U with RN tomorrow, and with Fermin Lou NP on Friday to re-pack. Pt should continue daily packing, and f/u with me on Friday 08/30. This plan was reviewed with the patient and patient agrees. All questions were answered.     Written by Josep Mayen, as dictated by Dr. Suzanne Ellis MD.

## 2019-08-23 ENCOUNTER — OFFICE VISIT (OUTPATIENT)
Dept: SURGERY | Age: 29
End: 2019-08-23

## 2019-08-23 VITALS
HEIGHT: 64 IN | SYSTOLIC BLOOD PRESSURE: 113 MMHG | HEART RATE: 60 BPM | DIASTOLIC BLOOD PRESSURE: 60 MMHG | BODY MASS INDEX: 23.9 KG/M2 | WEIGHT: 140 LBS

## 2019-08-23 DIAGNOSIS — N61.1 ABSCESS OF BREAST, RIGHT: Primary | ICD-10-CM

## 2019-08-23 NOTE — PATIENT INSTRUCTIONS

## 2019-08-25 LAB
BACTERIA SPEC CULT: ABNORMAL
BACTERIA SPEC CULT: NORMAL
GRAM STN SPEC: ABNORMAL
SERVICE CMNT-IMP: ABNORMAL
SERVICE CMNT-IMP: NORMAL

## 2019-08-26 ENCOUNTER — CLINICAL SUPPORT (OUTPATIENT)
Dept: SURGERY | Age: 29
End: 2019-08-26

## 2019-08-26 VITALS — DIASTOLIC BLOOD PRESSURE: 50 MMHG | HEART RATE: 65 BPM | SYSTOLIC BLOOD PRESSURE: 109 MMHG

## 2019-08-26 DIAGNOSIS — N61.1 ABSCESS OF BREAST, RIGHT: Primary | ICD-10-CM

## 2019-08-26 NOTE — PROGRESS NOTES
Patient here for RIGHT breast packing change. Color of skin more pink than red at abscess site and appears to be granulating, with edges less friable appearing, but still some raw tissue present, which she states is the only really painful area. Patient continues to take antibiotics. She was given supplies to change packing at home and if she feels comfortable will try, but otherwise scheduled for nurse visit for packing change on Wednesday, Aug. 28, and follow-up with Dr. Sebastian Hammer is already scheduled for Friday, Aug. 30th. Patient will call to cancel the 8/28 appointment if she is able to change packing herself, and is comfortable with this plan.

## 2019-08-27 ENCOUNTER — TELEPHONE (OUTPATIENT)
Dept: SURGERY | Age: 29
End: 2019-08-27

## 2019-08-28 ENCOUNTER — DOCUMENTATION ONLY (OUTPATIENT)
Dept: SURGERY | Age: 29
End: 2019-08-28

## 2019-08-28 NOTE — TELEPHONE ENCOUNTER
Attempted to call patient yesterday and today to see if she needs to come in today for a packing change. Her number is not a working number, her boyfriend's voicemail is full, and I did not feel comfortable leaving a message on her mom's number.

## 2019-08-28 NOTE — PROGRESS NOTES
Spoke to [facility] to discuss whether or not this patient could qualify for their program.  She wanted to know if the patient had had a biopsy in the office. I told her she had not, but rather had an I and D and debridement.   It sounds like she does not qualify for their program.

## 2019-08-30 ENCOUNTER — OFFICE VISIT (OUTPATIENT)
Dept: SURGERY | Age: 29
End: 2019-08-30

## 2019-08-30 VITALS
HEIGHT: 64 IN | HEART RATE: 80 BPM | BODY MASS INDEX: 23.9 KG/M2 | WEIGHT: 140 LBS | SYSTOLIC BLOOD PRESSURE: 114 MMHG | DIASTOLIC BLOOD PRESSURE: 71 MMHG

## 2019-08-30 DIAGNOSIS — N61.1 BREAST ABSCESS: Primary | ICD-10-CM

## 2019-08-30 NOTE — COMMUNICATION BODY
HISTORY OF PRESENT ILLNESS  Irvin Coffey is a 34 y.o. female. HPI  ESTABLISHED patient here for follow-up of RIGHT breast abscess. She changed the packing herself two days ago and feels the hole is getting smaller and the redness and pain are much improved. She is still getting small amts reddish yellow drainage, but less each day. She does have some itching and dry skin on the breast. States she has one week of antibiotics left. History reviewed. No pertinent past medical history. History reviewed. No pertinent surgical history. Social History     Socioeconomic History    Marital status: SINGLE     Spouse name: Not on file    Number of children: Not on file    Years of education: Not on file    Highest education level: Not on file   Occupational History    Not on file   Social Needs    Financial resource strain: Not on file    Food insecurity:     Worry: Not on file     Inability: Not on file    Transportation needs:     Medical: Not on file     Non-medical: Not on file   Tobacco Use    Smoking status: Current Every Day Smoker     Packs/day: 1.00    Smokeless tobacco: Never Used   Substance and Sexual Activity    Alcohol use:  Yes     Alcohol/week: 6.0 standard drinks     Types: 6 Cans of beer per week    Drug use: Not on file    Sexual activity: Not on file   Lifestyle    Physical activity:     Days per week: Not on file     Minutes per session: Not on file    Stress: Not on file   Relationships    Social connections:     Talks on phone: Not on file     Gets together: Not on file     Attends Oriental orthodox service: Not on file     Active member of club or organization: Not on file     Attends meetings of clubs or organizations: Not on file     Relationship status: Not on file    Intimate partner violence:     Fear of current or ex partner: Not on file     Emotionally abused: Not on file     Physically abused: Not on file     Forced sexual activity: Not on file   Other Topics Concern    Not on file   Social History Narrative    Not on file       Current Outpatient Medications on File Prior to Visit   Medication Sig Dispense Refill    amoxicillin-clavulanate (AUGMENTIN) 875-125 mg per tablet Take 1 Tab by mouth two (2) times a day. Indications: skin infection 28 Tab 1     No current facility-administered medications on file prior to visit. No Known Allergies    OB History    None      Obstetric Comments   Menarche 16, LMP 8/19/19, # of children 0, age of 1st delivery N/A, Hysterectomy/oophorectomy No/No  Breast bx No, history of breast feeding No, BCP Yes, in the past Hormone therapy No               ROS    Physical Exam   Cardiovascular: Normal rate, regular rhythm and normal heart sounds. Pulmonary/Chest: Breath sounds normal. Right breast exhibits no inverted nipple, no mass, no nipple discharge, no skin change and no tenderness. Left breast exhibits no inverted nipple, no mass, no nipple discharge, no skin change and no tenderness. Breasts are symmetrical.       Lymphadenopathy:     She has no cervical adenopathy. Right cervical: No superficial cervical, no deep cervical and no posterior cervical adenopathy present. Left cervical: No superficial cervical, no deep cervical and no posterior cervical adenopathy present. She has no axillary adenopathy. Right axillary: No pectoral and no lateral adenopathy present. Left axillary: No pectoral and no lateral adenopathy present. ASSESSMENT and PLAN    ICD-10-CM ICD-9-CM    1. Breast abscess N61.1 611.0       Patient presents to f/u on RIGHT breast abscess, and is doing well overall. Clean, well contracted, granulating abscess cavity. Packing removed. Pt may stop packing, but should continue to keep clean and covered with dry, sterile dressing. F/U in 4 weeks. This plan was reviewed with the patient and patient agrees. All questions were answered.     Written by Gavino Rivera, as dictated by Dr. Ritesh Dominique MD.

## 2019-08-30 NOTE — PROGRESS NOTES
HISTORY OF PRESENT ILLNESS  Raul Jain is a 34 y.o. female. HPI  ESTABLISHED patient here for follow-up of RIGHT breast abscess. She changed the packing herself two days ago and feels the hole is getting smaller and the redness and pain are much improved. She is still getting small amts reddish yellow drainage, but less each day. She does have some itching and dry skin on the breast. States she has one week of antibiotics left. History reviewed. No pertinent past medical history. History reviewed. No pertinent surgical history. Social History     Socioeconomic History    Marital status: SINGLE     Spouse name: Not on file    Number of children: Not on file    Years of education: Not on file    Highest education level: Not on file   Occupational History    Not on file   Social Needs    Financial resource strain: Not on file    Food insecurity:     Worry: Not on file     Inability: Not on file    Transportation needs:     Medical: Not on file     Non-medical: Not on file   Tobacco Use    Smoking status: Current Every Day Smoker     Packs/day: 1.00    Smokeless tobacco: Never Used   Substance and Sexual Activity    Alcohol use:  Yes     Alcohol/week: 6.0 standard drinks     Types: 6 Cans of beer per week    Drug use: Not on file    Sexual activity: Not on file   Lifestyle    Physical activity:     Days per week: Not on file     Minutes per session: Not on file    Stress: Not on file   Relationships    Social connections:     Talks on phone: Not on file     Gets together: Not on file     Attends Anglican service: Not on file     Active member of club or organization: Not on file     Attends meetings of clubs or organizations: Not on file     Relationship status: Not on file    Intimate partner violence:     Fear of current or ex partner: Not on file     Emotionally abused: Not on file     Physically abused: Not on file     Forced sexual activity: Not on file   Other Topics Concern    Not on file   Social History Narrative    Not on file       Current Outpatient Medications on File Prior to Visit   Medication Sig Dispense Refill    amoxicillin-clavulanate (AUGMENTIN) 875-125 mg per tablet Take 1 Tab by mouth two (2) times a day. Indications: skin infection 28 Tab 1     No current facility-administered medications on file prior to visit. No Known Allergies    OB History    None      Obstetric Comments   Menarche 16, LMP 8/19/19, # of children 0, age of 1st delivery N/A, Hysterectomy/oophorectomy No/No  Breast bx No, history of breast feeding No, BCP Yes, in the past Hormone therapy No               ROS    Physical Exam   Cardiovascular: Normal rate, regular rhythm and normal heart sounds. Pulmonary/Chest: Breath sounds normal. Right breast exhibits no inverted nipple, no mass, no nipple discharge, no skin change and no tenderness. Left breast exhibits no inverted nipple, no mass, no nipple discharge, no skin change and no tenderness. Breasts are symmetrical.       Lymphadenopathy:     She has no cervical adenopathy. Right cervical: No superficial cervical, no deep cervical and no posterior cervical adenopathy present. Left cervical: No superficial cervical, no deep cervical and no posterior cervical adenopathy present. She has no axillary adenopathy. Right axillary: No pectoral and no lateral adenopathy present. Left axillary: No pectoral and no lateral adenopathy present. ASSESSMENT and PLAN    ICD-10-CM ICD-9-CM    1. Breast abscess N61.1 611.0       Patient presents to f/u on RIGHT breast abscess, and is doing well overall. Clean, well contracted, granulating abscess cavity. Packing removed. Pt may stop packing, but should continue to keep clean and covered with dry, sterile dressing. F/U in 4 weeks. This plan was reviewed with the patient and patient agrees. All questions were answered.     Written by Janette Logan, as dictated by Dr. Joyce Sandhoff, MD.

## 2019-08-30 NOTE — PROGRESS NOTES
HISTORY OF PRESENT ILLNESS  Shiva Arguelles is a 34 y.o. female. HPI ESTABLISHED patient here for follow-up of RIGHT breast abscess. She changed the packing herself two days ago and feels the hole is getting smaller and the redness and pain are much improved. She is still getting small amts reddish yellow drainage, but less each day. She does have some itching and dry skin on the breast. States she has one week of antibiotics left.     ROS    Physical Exam    ASSESSMENT and PLAN  {ASSESSMENT/PLAN:97863}

## 2019-08-30 NOTE — PATIENT INSTRUCTIONS

## 2019-09-12 LAB
AEROBIC ID BY SEQ, ORI2T: NORMAL
SPECIMEN SOURCE: NORMAL

## 2019-09-16 LAB
AEROBIC ID BY MALDI, ORJ11T: NORMAL
AMPICILLIN MIC, SUS2T: NORMAL
BACTERIA SPEC: NORMAL
ERYTHROMYCIN MIC, SUS3T: NORMAL UG/ML
GENTAMICIN MIC, SUS4T: NORMAL UG/ML
ORG ID BY SEQUENCING, ORI1T: NORMAL
ORGANISM ID BY MALDI, ORJ1T: NORMAL
PENICILLIN MIC, SUS5T: NORMAL UG/ML
RIFAMPIN MIC, SUS6T: NORMAL UG/ML
SOURCE, RSRC62: NORMAL
SOURCE, RSRC67: NORMAL
SPECIMEN SOURCE: NORMAL
TETRACYCLINE MIC, SUS7T: NORMAL UG/ML
VANCOMYCIN MIC, SUS8T: NORMAL UG/ML

## 2022-03-18 PROBLEM — R65.10 SIRS (SYSTEMIC INFLAMMATORY RESPONSE SYNDROME) (HCC): Status: ACTIVE | Noted: 2019-08-20

## 2022-03-19 PROBLEM — N61.1 BREAST ABSCESS: Status: ACTIVE | Noted: 2019-08-20

## 2022-06-02 ENCOUNTER — OFFICE VISIT (OUTPATIENT)
Dept: FAMILY MEDICINE CLINIC | Age: 32
End: 2022-06-02
Payer: COMMERCIAL

## 2022-06-02 VITALS
WEIGHT: 111 LBS | RESPIRATION RATE: 12 BRPM | BODY MASS INDEX: 18.95 KG/M2 | OXYGEN SATURATION: 99 % | DIASTOLIC BLOOD PRESSURE: 75 MMHG | TEMPERATURE: 98.3 F | HEIGHT: 64 IN | HEART RATE: 102 BPM | SYSTOLIC BLOOD PRESSURE: 112 MMHG

## 2022-06-02 DIAGNOSIS — F41.9 ANXIETY: Primary | ICD-10-CM

## 2022-06-02 DIAGNOSIS — R91.1 LESION OF LUNG: ICD-10-CM

## 2022-06-02 DIAGNOSIS — R63.4 WEIGHT LOSS: ICD-10-CM

## 2022-06-02 DIAGNOSIS — R53.82 CHRONIC FATIGUE: ICD-10-CM

## 2022-06-02 DIAGNOSIS — R61 NIGHT SWEATS: ICD-10-CM

## 2022-06-02 DIAGNOSIS — D72.9 NEUTROPHILIA: ICD-10-CM

## 2022-06-02 DIAGNOSIS — R93.89 ABNORMAL CXR: ICD-10-CM

## 2022-06-02 PROCEDURE — 99214 OFFICE O/P EST MOD 30 MIN: CPT | Performed by: FAMILY MEDICINE

## 2022-06-02 RX ORDER — ESCITALOPRAM OXALATE 10 MG/1
10 TABLET ORAL DAILY
Qty: 30 TABLET | Refills: 1 | Status: SHIPPED | OUTPATIENT
Start: 2022-06-02 | End: 2022-06-06 | Stop reason: RX

## 2022-06-02 NOTE — PROGRESS NOTES
Chente Franklin (: 1990) is a 28 y.o. female, established patient, here for evaluation of the following chief complaint(s):  New Patient, Establish Care, Fatigue (Pt stated that she has been lacking low energy.), and Ankle swelling (Right ankle swelling )         ASSESSMENT/PLAN:  Below is the assessment and plan developed based on review of pertinent history, physical exam, labs, studies, and medications. 1. Anxiety  Start Lexapro  Encourage patient to start seeing a counselor  Encouraged marijuana smoking cessation. Discussed with patient the effect of marijuana on sleep appetite. She voices understanding and states she will start to cut back on her marijuana usage.  -     escitalopram oxalate (LEXAPRO) 10 mg tablet; Take 1 Tablet by mouth daily. , Normal, Disp-30 Tablet, R-1    2. Chronic fatigue  DDx includes mood vs insomnia vs vit d deficiency vs anemia vs thyroid disease vs other   Check labs   Treat anxiety  Encourage marijuana smoking cessation and sleep hygiene to help with sleep. -     CBC WITH AUTOMATED DIFF; Future  -     METABOLIC PANEL, COMPREHENSIVE; Future  -     TSH 3RD GENERATION; Future  -     HEMOGLOBIN A1C WITH EAG; Future  -     VITAMIN D, 25 HYDROXY; Future    3. Weight loss  Patient reported 20 pound weight loss in last 3 months as her appetite decreased when anxiety was severe. She is subsequently gained back 5 pounds. Also has intermittent night sweats once a week for 3 months. Plan to  monitor weight at follow-up appointment and check labs today. Return in about 4 weeks (around 2022). SUBJECTIVE/OBJECTIVE:  Chief Complaint   Patient presents with    New Patient    Establish Care    Fatigue     Pt stated that she has been lacking low energy.  Ankle swelling     Right ankle swelling      Patient is a 40-year-old female with history of anxiety presented the office to establish care.     Patient states she was parked to come see a primary care physician as over the last several months she has felt her anxiety has been worsening, and she has lost 20 pounds. Patient notes she has daily feelings of being anxious, irritable, on edge. She will lie awake at night with her mind racing and wake multiple times throughout the night with her mind racing. Notes her feelings of anxiety when she goes into public spaces or crowds impact her ability to function on a daily basis. She has significant anxiety over thinking of going back to work in [employer] which she is planning to do next month. She denies any feelings of being down depressed or hopeless. Denies any tracey or hypomania. No hallucinations or delusions. No thoughts of harming her self or others. She was on hydroxyzine in the past remembers not like how this made her feel tired. She did see a psychiatrist in the past but did not like her demeanor so she stopped going. She does report that she feels fatigued and rundown throughout the day. She is only sleeping 4 to 5 hours a night due to her anxiety. She does not waking up gasping for air, she does not snore. Does report she will wake in the AM drenched in sweat about once per week. She does not feel like she could take naps throughout the day just feels overall rundown. She notes about 4 months ago she stood on her tiptoes for too long and her right foot went numb and seemed slightly swollen for a few days however this was completely resolved. She smokes several cigarettes up to 1 pack/day. She smokes marijuana nightly before going to sleep. She has lost about 20 pounds in the last 4 months, however she has gained 5 pounds back in the last 3 weeks. She reports she is hardly been eating due to feeling anxious. This is cyclical in nature for her while she were eat well for several months and then not eat well for several months due to just not feeling hungry.   She does feel like she will occasionally be sweating at night when she wakes up anxious. Denies any unexplained fevers or chills, denies any vomiting. She does feel nauseous sometimes after she eats. She does not feel like she has early satiety, rather went through a period of a few weeks where she just did not feel hungry. Now she notes she has felt exceptionally hungry in the last few days. During that time she also did not have a menstrual cycle for 4 months. She did not take a UPT at the time but she did have her menstrual cycle 3 weeks ago. Her mom has gastroparesis and Crohn's disease as well as A. fib. Her dad has gastroparesis. She does have FHx of thyroid disease. Review of Systems   Constitutional: Negative for fatigue and fever. HENT: Negative for hearing loss. Eyes: Negative for pain and visual disturbance. Respiratory: Negative for cough, chest tightness and shortness of breath. Cardiovascular: Negative for chest pain, palpitations and leg swelling. Gastrointestinal: Negative for abdominal distention, abdominal pain, blood in stool, constipation and diarrhea. Endocrine: Negative for cold intolerance, heat intolerance and polydipsia. Genitourinary: Negative for dysuria, flank pain, frequency and menstrual problem. Musculoskeletal: Negative for arthralgias and joint swelling. Neurological: Negative for dizziness, light-headedness and headaches. Psychiatric/Behavioral: Negative for agitation and behavioral problems. Current Outpatient Medications on File Prior to Visit   Medication Sig Dispense Refill    [DISCONTINUED] amoxicillin-clavulanate (AUGMENTIN) 875-125 mg per tablet Take 1 Tab by mouth two (2) times a day. Indications: skin infection (Patient not taking: Reported on 6/2/2022) 28 Tab 1     No current facility-administered medications on file prior to visit. No Known Allergies  History reviewed. No pertinent surgical history.   Social History     Tobacco Use    Smoking status: Current Every Day Smoker Packs/day: 1.00    Smokeless tobacco: Never Used   Vaping Use    Vaping Use: Never used   Substance Use Topics    Alcohol use: Yes     Alcohol/week: 6.0 standard drinks     Types: 6 Cans of beer per week    Drug use: Yes     Types: Marijuana     Family History   Problem Relation Age of Onset    Crohn's Disease Mother     Atrial Fibrillation Mother        Vitals:    06/02/22 1327   BP: 112/75   Pulse: (!) 102   Resp: 12   Temp: 98.3 °F (36.8 °C)   TempSrc: Oral   SpO2: 99%   Weight: 111 lb (50.3 kg)   Height: 5' 4.37\" (1.635 m)   PainSc:   0 - No pain   LMP: 05/23/2022        Physical Exam  Constitutional:       Appearance: Normal appearance. HENT:      Head: Normocephalic and atraumatic. Right Ear: Tympanic membrane, ear canal and external ear normal.      Left Ear: Tympanic membrane, ear canal and external ear normal.   Eyes:      Extraocular Movements: Extraocular movements intact. Conjunctiva/sclera: Conjunctivae normal.      Pupils: Pupils are equal, round, and reactive to light. Neck:      Thyroid: No thyroid mass, thyromegaly or thyroid tenderness. Cardiovascular:      Rate and Rhythm: Normal rate and regular rhythm. Pulses: Normal pulses. Heart sounds: Normal heart sounds. Pulmonary:      Effort: Pulmonary effort is normal.      Breath sounds: Normal breath sounds. Abdominal:      General: Abdomen is flat. Bowel sounds are normal.      Palpations: Abdomen is soft. Musculoskeletal:      Cervical back: Normal range of motion and neck supple. No rigidity or tenderness. Lymphadenopathy:      Cervical: No cervical adenopathy. Neurological:      Mental Status: She is alert. Psychiatric:         Mood and Affect: Mood normal.         Behavior: Behavior normal.         An electronic signature was used to authenticate this note.   -- Citlali Bell PA-C

## 2022-06-02 NOTE — PATIENT INSTRUCTIONS

## 2022-06-02 NOTE — PROGRESS NOTES
Meryl Baig is a 28 y.o. female    Chief Complaint   Patient presents with    New Patient    Establish Care    Fatigue     Pt stated that she has been lacking low energy.  Ankle swelling     Right ankle swelling        1. Have you been to the ER, urgent care clinic since your last visit? Hospitalized since your last visit? No    2. Have you seen or consulted any other health care providers outside of the [address] [place] [address] since your last visit? Include any pap smears or colon screening.  No

## 2022-06-03 LAB
25(OH)D3 SERPL-MCNC: 15.5 NG/ML (ref 30–100)
ALBUMIN SERPL-MCNC: 3.4 G/DL (ref 3.5–5)
ALBUMIN/GLOB SERPL: 0.9 {RATIO} (ref 1.1–2.2)
ALP SERPL-CCNC: 132 U/L (ref 45–117)
ALT SERPL-CCNC: 14 U/L (ref 12–78)
ANION GAP SERPL CALC-SCNC: 4 MMOL/L (ref 5–15)
AST SERPL-CCNC: 8 U/L (ref 15–37)
BASOPHILS # BLD: 0.2 K/UL (ref 0–0.1)
BASOPHILS NFR BLD: 1 % (ref 0–1)
BILIRUB SERPL-MCNC: 0.3 MG/DL (ref 0.2–1)
BUN SERPL-MCNC: 10 MG/DL (ref 6–20)
BUN/CREAT SERPL: 20 (ref 12–20)
CALCIUM SERPL-MCNC: 9.5 MG/DL (ref 8.5–10.1)
CHLORIDE SERPL-SCNC: 104 MMOL/L (ref 97–108)
CO2 SERPL-SCNC: 27 MMOL/L (ref 21–32)
CREAT SERPL-MCNC: 0.51 MG/DL (ref 0.55–1.02)
DIFFERENTIAL METHOD BLD: ABNORMAL
EOSINOPHIL # BLD: 0.2 K/UL (ref 0–0.4)
EOSINOPHIL NFR BLD: 1 % (ref 0–7)
ERYTHROCYTE [DISTWIDTH] IN BLOOD BY AUTOMATED COUNT: 18.6 % (ref 11.5–14.5)
EST. AVERAGE GLUCOSE BLD GHB EST-MCNC: 100 MG/DL
GLOBULIN SER CALC-MCNC: 3.6 G/DL (ref 2–4)
GLUCOSE SERPL-MCNC: 100 MG/DL (ref 65–100)
HBA1C MFR BLD: 5.1 % (ref 4–5.6)
HCT VFR BLD AUTO: 37.6 % (ref 35–47)
HGB BLD-MCNC: 10.7 G/DL (ref 11.5–16)
IMM GRANULOCYTES # BLD AUTO: 0 K/UL (ref 0–0.04)
IMM GRANULOCYTES NFR BLD AUTO: 0 % (ref 0–0.5)
LYMPHOCYTES # BLD: 2.4 K/UL (ref 0.8–3.5)
LYMPHOCYTES NFR BLD: 16 % (ref 12–49)
MCH RBC QN AUTO: 24.3 PG (ref 26–34)
MCHC RBC AUTO-ENTMCNC: 28.5 G/DL (ref 30–36.5)
MCV RBC AUTO: 85.5 FL (ref 80–99)
MONOCYTES # BLD: 0.9 K/UL (ref 0–1)
MONOCYTES NFR BLD: 6 % (ref 5–13)
NEUTS SEG # BLD: 11.4 K/UL (ref 1.8–8)
NEUTS SEG NFR BLD: 76 % (ref 32–75)
NRBC # BLD: 0 K/UL (ref 0–0.01)
NRBC BLD-RTO: 0 PER 100 WBC
PLATELET # BLD AUTO: 588 K/UL (ref 150–400)
PMV BLD AUTO: 10.5 FL (ref 8.9–12.9)
POTASSIUM SERPL-SCNC: 4.7 MMOL/L (ref 3.5–5.1)
PROT SERPL-MCNC: 7 G/DL (ref 6.4–8.2)
RBC # BLD AUTO: 4.4 M/UL (ref 3.8–5.2)
RBC MORPH BLD: ABNORMAL
SODIUM SERPL-SCNC: 135 MMOL/L (ref 136–145)
TSH SERPL DL<=0.05 MIU/L-ACNC: 0.97 UIU/ML (ref 0.36–3.74)
WBC # BLD AUTO: 15.1 K/UL (ref 3.6–11)

## 2022-06-03 NOTE — PROGRESS NOTES
Spoke to pt. Patient x2 id verified. Informed lab results to pt, pt verbalized understanding. -pt will call back to schedule 10 day f/up appointment.

## 2022-06-03 NOTE — PROGRESS NOTES
I called and left  for patient to return call. Please try to reach again. Labs show her Vit D is low. I recommend she start a Vit D supplement with 1000IU per day and recheck in 3 months. Her white blood cell count and platelets were high however her red blood cell counts were low with evidence of a microcytic anemia. These could be due to several reasons such as inflammation, iron deficiency anemia, or an infection. I recommend with the elevated white count, weight loss and night sweats we check a chest x ray for evaluation of tuberculosis ASAP. I have ordered this and she can go to a [facility] to have this done. I also recommend she return to office in 10 days for further labs for HIV, hepatitis C testing, iron, b12, folate and repeat her CBC with peripheral smear. If she develops any cough, fevers, signs of a urinary tract infection she should let us know. Thyroid and A1C were normal. 1 liver enzyme was a little elevated (alk phos), we will recheck in 3 months.

## 2022-06-13 ENCOUNTER — HOSPITAL ENCOUNTER (OUTPATIENT)
Dept: GENERAL RADIOLOGY | Age: 32
Discharge: HOME | End: 2022-06-13
Payer: COMMERCIAL

## 2022-06-13 DIAGNOSIS — R53.82 CHRONIC FATIGUE: ICD-10-CM

## 2022-06-13 DIAGNOSIS — R61 NIGHT SWEATS: ICD-10-CM

## 2022-06-13 DIAGNOSIS — D72.9 NEUTROPHILIA: ICD-10-CM

## 2022-06-13 DIAGNOSIS — R63.4 WEIGHT LOSS: ICD-10-CM

## 2022-06-13 PROCEDURE — 71046 X-RAY EXAM CHEST 2 VIEWS: CPT

## 2022-06-13 RX ORDER — AMOXICILLIN 500 MG/1
1000 CAPSULE ORAL 3 TIMES DAILY
Qty: 30 CAPSULE | Refills: 0 | Status: SHIPPED | OUTPATIENT
Start: 2022-06-13 | End: 2022-06-18

## 2022-06-13 RX ORDER — DOXYCYCLINE 100 MG/1
100 TABLET ORAL 2 TIMES DAILY
Qty: 10 TABLET | Refills: 0 | Status: SHIPPED | OUTPATIENT
Start: 2022-06-13 | End: 2022-06-22 | Stop reason: SDUPTHER

## 2022-06-13 NOTE — PROGRESS NOTES
[facility]: please fax Quantiferon gold to [facility] on [address]    I called and spoke with patient regarding xray. Patient states she has no cough, no fever or chills currently. She actually feels very well. She did go to care home for 15 days in 2009 otherwise no travel outside of [place], no IV drug use, has not lived in a group home. Plan to treat for possible CAP as seen on cxr-- abx called to pharmacy.    [facility]   Chest CT scheduled for 6/15/2022 at 9:30am at [facility]     Follow up as scheduled in 3 days

## 2022-06-14 ENCOUNTER — TELEPHONE (OUTPATIENT)
Dept: FAMILY MEDICINE CLINIC | Age: 32
End: 2022-06-14

## 2022-06-14 NOTE — TELEPHONE ENCOUNTER
Emily from [facility] called in and is asking if you could please give her a call back at [phone]. States it's in regards to her xr and ct. Thanks.

## 2022-06-15 ENCOUNTER — HOSPITAL ENCOUNTER (OUTPATIENT)
Dept: CT IMAGING | Age: 32
Discharge: HOME | End: 2022-06-15
Attending: FAMILY MEDICINE
Payer: COMMERCIAL

## 2022-06-15 DIAGNOSIS — R93.89 ABNORMAL CXR: ICD-10-CM

## 2022-06-15 DIAGNOSIS — R63.4 WEIGHT LOSS: ICD-10-CM

## 2022-06-15 DIAGNOSIS — R53.82 CHRONIC FATIGUE: ICD-10-CM

## 2022-06-15 DIAGNOSIS — R61 NIGHT SWEATS: ICD-10-CM

## 2022-06-15 DIAGNOSIS — D72.9 NEUTROPHILIA: ICD-10-CM

## 2022-06-15 PROCEDURE — 74011000636 HC RX REV CODE- 636: Performed by: FAMILY MEDICINE

## 2022-06-15 PROCEDURE — 71260 CT THORAX DX C+: CPT

## 2022-06-15 RX ADMIN — IOPAMIDOL 100 ML: 612 INJECTION, SOLUTION INTRAVENOUS at 09:36

## 2022-06-15 NOTE — TELEPHONE ENCOUNTER
I called and spoke with Emily. They were verifying the provider had CXR report from 6/13/2022. I have reviewed report and followed up appropriately. See result note on CXR.

## 2022-06-15 NOTE — PROGRESS NOTES
Specified documents faxed to [facility]; Tessa Walton, per providers request. Fax confirmation received and placed in scanning.

## 2022-06-15 NOTE — PROGRESS NOTES
Nilda Trammell-- I spoke with the [address] to let her know about the probable TB diagnosis. They need the patient demographics. imaging (CT, CXR) and office notes and labs ASAP faxed to [phone]. Can you help me with this this afternoon? I also tried to reach the patient but there was no answer and I left a  for a return call. Can you try to reach her again? Per recommendation from the health department. She needs to self isolate immediately and STOP all the antibiotics for pneumonia that were prescribed Monday. Once they receive the information faxed they will do a home visit, further test the patient, and treat from there.      Glenroy Pickens PA-C

## 2022-06-15 NOTE — PROGRESS NOTES
I was able to speak with patient. She was informed of CT results. She was informed to self isolate. She was informed to stop abx for CAP and she states she actually did not start these. She was informed health department will be in touch and she voiced understanding.

## 2022-06-15 NOTE — PROGRESS NOTES
Specified documents faxed to [facility]; Valentino Wilson, per providers request. Fax confirmation received and placed in scanning.

## 2022-06-17 ENCOUNTER — TELEPHONE (OUTPATIENT)
Dept: FAMILY MEDICINE CLINIC | Age: 32
End: 2022-06-17

## 2022-06-17 NOTE — TELEPHONE ENCOUNTER
Received call from [facility] health representative 6/16/2022 stating coverage for CT chest was declined due to inadequate information, they did not receive previous CXR. They will fax over required information to our office for further information.

## 2022-06-19 LAB
GAMMA INTERFERON BACKGROUND BLD IA-ACNC: 2.67 IU/ML
M TB IFN-G BLD-IMP: NEGATIVE
M TB IFN-G CD4+ BCKGRND COR BLD-ACNC: 0.02 IU/ML
MITOGEN IGNF BLD-ACNC: 7 IU/ML
QUANTIFERON INCUBATION, QF1T: NORMAL
QUANTIFERON TB2 AG: 0.01 IU/ML
SERVICE CMNT-IMP: NORMAL

## 2022-06-21 ENCOUNTER — TELEPHONE (OUTPATIENT)
Dept: FAMILY MEDICINE CLINIC | Age: 32
End: 2022-06-21

## 2022-06-21 NOTE — PROGRESS NOTES
I attempted to reach patient as well as health department, JAMES Gann with Tuberculosis department regarding negative quantiferon test result. I left messages for both to return call to the office. When we speak with the health department, what are their next steps since the quantiferon is negative?  Patient needs to continue following health department's recommendations

## 2022-06-21 NOTE — TELEPHONE ENCOUNTER
----- Message from Yara Cevallos PA-C sent at 6/21/2022  9:32 AM EDT -----  I attempted to reach patient as well as health department, JAMES Slaughter Links with Tuberculosis department regarding negative quantiferon test result. I left messages for both to return call to the office. When we speak with the health department, what are their next steps since the quantiferon is negative?  Patient needs to continue following health department's recommendations

## 2022-06-21 NOTE — TELEPHONE ENCOUNTER
I spoke with Albert Emmanuel from the Health Department and since the patients quantiFERION was negative and the sputum results can take up to eight weeks the patient would need to be contacted by you for next steps and asked if the patient would possibly need a referral to pulmonary?

## 2022-06-22 DIAGNOSIS — R63.4 WEIGHT LOSS: ICD-10-CM

## 2022-06-22 DIAGNOSIS — D72.9 NEUTROPHILIA: ICD-10-CM

## 2022-06-22 DIAGNOSIS — R93.89 ABNORMAL CXR: ICD-10-CM

## 2022-06-22 DIAGNOSIS — R61 NIGHT SWEATS: ICD-10-CM

## 2022-06-22 RX ORDER — DOXYCYCLINE 100 MG/1
100 TABLET ORAL 2 TIMES DAILY
Qty: 10 TABLET | Refills: 0 | Status: SHIPPED | OUTPATIENT
Start: 2022-06-22 | End: 2022-06-27

## 2022-06-22 NOTE — TELEPHONE ENCOUNTER
Pt called in and states she received a call from you but missed it. Is wondering if you could call her back when you get a chance please. Call back number for her is [phone]. Thanks.

## 2022-06-22 NOTE — PROGRESS NOTES
I called and spoke with patient 6/22/2022. She states health department did draw labwork on her last week, I will attempt to reach health department again tomorrow to verify what was completed. I called and left a VM today for Cira Becerra RN at [facility] department to return my call. 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 With quantiferon negative, will treat for possible CAP seen on imaging with augmentin and doxycycline. Patient has augmentin called in last week and I sent new doxycycline rx today. Patients states in the last 2 weeks she has not had any fever, night sweats, chills, cough, SOB. Will also refer to pulmonologist, pulmonary associates or [facility] for further follow up.    Plan of care discussed with Dr. Yony Mirza 6/22/2022

## 2022-06-23 NOTE — PROGRESS NOTES
Abner colon-- I called [facility] [place] and gave verbal referral but can your print referral and fax that, last office note, labs, CXR, CT and labs to [facility] #[phone]

## 2022-06-27 ENCOUNTER — TELEPHONE (OUTPATIENT)
Dept: FAMILY MEDICINE CLINIC | Age: 32
End: 2022-06-27

## 2022-06-27 ENCOUNTER — TRANSCRIBE ORDER (OUTPATIENT)
Dept: SCHEDULING | Age: 32
End: 2022-06-27

## 2022-06-27 DIAGNOSIS — J98.4 CAVITARY PNEUMONIA: Primary | ICD-10-CM

## 2022-06-27 DIAGNOSIS — J18.9 CAVITARY PNEUMONIA: Primary | ICD-10-CM

## 2022-06-27 NOTE — TELEPHONE ENCOUNTER
I called and spoke with Dr. Narciso Del Rosario [facility] [address] who patient saw 6/24/2022. He recommends finishing abx amoxicillin and doxycycline and repeat CT chest in 6 weeks which he has ordered. He advised TB unlikely with negative quantiferon and no symptoms of acute illness currently however to follow up on sputum cultures which health department has collected. Depending on the repeat CT Dr. Narciso Del Rosario recommended they may do bronchoscopy but will wait and see CT result. I called and spoke with patient. She stated she has almost finished augmentin and was unable to  doxycyline due to insurance coverage. I spoke with pharmacy and they stated capsules were needed not tablets. They stated they faxed alternate order to our office however I have not received this fax. I gave verbal order for doxycyline 100mg BID capsules to be filled. I spoke with patient again and she is aware of the doxycyline Rx at the pharmacy. She states she is still feeling well. No cough, SOB, fever, chills, weight loss or night sweats in the last month. She did state yesterday she had bilateral lower extremity edema that has resolved today, no calf pain and no PE sx. I advised patient if acute lower extremity edema returns in the future with sx concerning for DVT to always go to ED. She voiced understanding. I scheduled patient in office visit this Friday for repeat labs (CBC) and discuss mood and other concerns. I attempted to call [facility] department JAMES Samuel and Kodi Moses who have been working with patient and left  for return call.    Plan of care discussed with Dr. Magda Aaron

## 2022-07-01 ENCOUNTER — OFFICE VISIT (OUTPATIENT)
Dept: FAMILY MEDICINE CLINIC | Age: 32
End: 2022-07-01
Payer: COMMERCIAL

## 2022-07-01 VITALS
WEIGHT: 122 LBS | SYSTOLIC BLOOD PRESSURE: 108 MMHG | HEIGHT: 64 IN | OXYGEN SATURATION: 100 % | TEMPERATURE: 99 F | BODY MASS INDEX: 20.83 KG/M2 | DIASTOLIC BLOOD PRESSURE: 71 MMHG | HEART RATE: 75 BPM | RESPIRATION RATE: 18 BRPM

## 2022-07-01 DIAGNOSIS — R91.1 LESION OF LUNG: ICD-10-CM

## 2022-07-01 DIAGNOSIS — F41.9 ANXIETY: Primary | ICD-10-CM

## 2022-07-01 DIAGNOSIS — D72.829 LEUKOCYTOSIS, UNSPECIFIED TYPE: ICD-10-CM

## 2022-07-01 DIAGNOSIS — D75.839 THROMBOCYTOSIS: ICD-10-CM

## 2022-07-01 DIAGNOSIS — R74.8 ELEVATED ALKALINE PHOSPHATASE LEVEL: ICD-10-CM

## 2022-07-01 DIAGNOSIS — D64.9 ANEMIA, UNSPECIFIED TYPE: ICD-10-CM

## 2022-07-01 DIAGNOSIS — Z11.59 NEED FOR HEPATITIS C SCREENING TEST: ICD-10-CM

## 2022-07-01 DIAGNOSIS — E87.1 HYPONATREMIA: ICD-10-CM

## 2022-07-01 DIAGNOSIS — Z11.4 SCREENING FOR HIV (HUMAN IMMUNODEFICIENCY VIRUS): ICD-10-CM

## 2022-07-01 DIAGNOSIS — E55.9 VITAMIN D DEFICIENCY: ICD-10-CM

## 2022-07-01 PROCEDURE — 99214 OFFICE O/P EST MOD 30 MIN: CPT | Performed by: FAMILY MEDICINE

## 2022-07-01 NOTE — PATIENT INSTRUCTIONS

## 2022-07-01 NOTE — PROGRESS NOTES
Chief Complaint   Patient presents with   [address]     1. Patient in office today for follow up on medication,  Pt was started on lexapro 4wks ago. Have noted improvement in mood,energy levels,and weight gain. 2.Pt states is currently on abx therapy. In office today for labs. 1. Have you been to the ER, urgent care clinic since your last visit? Hospitalized since your last visit? No    2. Have you seen or consulted any other health care providers outside of the [address] [place] [address] since your last visit? Include any pap smears or colon screening.  No

## 2022-07-06 ENCOUNTER — HOSPITAL ENCOUNTER (EMERGENCY)
Age: 32
Discharge: HOME | End: 2022-07-06
Attending: EMERGENCY MEDICINE
Payer: COMMERCIAL

## 2022-07-06 VITALS
RESPIRATION RATE: 16 BRPM | HEIGHT: 64 IN | SYSTOLIC BLOOD PRESSURE: 106 MMHG | BODY MASS INDEX: 20.83 KG/M2 | WEIGHT: 122 LBS | HEART RATE: 95 BPM | DIASTOLIC BLOOD PRESSURE: 60 MMHG | OXYGEN SATURATION: 100 % | TEMPERATURE: 98.3 F

## 2022-07-06 DIAGNOSIS — R60.9 PERIPHERAL EDEMA: Primary | ICD-10-CM

## 2022-07-06 LAB
ALBUMIN SERPL-MCNC: 4 G/DL (ref 3.5–5.2)
ALBUMIN/GLOB SERPL: 1.5 {RATIO} (ref 1.1–2.2)
ALP SERPL-CCNC: 108 U/L (ref 35–104)
ALT SERPL-CCNC: 8 U/L (ref 10–35)
ANION GAP SERPL CALC-SCNC: 8 MMOL/L (ref 5–15)
AST SERPL-CCNC: 15 U/L (ref 10–35)
BASOPHILS # BLD: 0.1 K/UL (ref 0–0.1)
BASOPHILS NFR BLD: 1 % (ref 0–1)
BILIRUB SERPL-MCNC: <0.2 MG/DL (ref 0.2–1)
BNP SERPL-MCNC: 31 PG/ML
BUN SERPL-MCNC: 8 MG/DL (ref 6–20)
BUN/CREAT SERPL: 53 (ref 12–20)
CALCIUM SERPL-MCNC: 9.4 MG/DL (ref 8.6–10)
CHLORIDE SERPL-SCNC: 101 MMOL/L (ref 98–107)
CO2 SERPL-SCNC: 30 MMOL/L (ref 22–29)
COMMENT, HOLDF: NORMAL
CREAT SERPL-MCNC: <0.47 MG/DL (ref 0.5–0.9)
DIFFERENTIAL METHOD BLD: ABNORMAL
EOSINOPHIL # BLD: 0.5 K/UL (ref 0–0.4)
EOSINOPHIL NFR BLD: 5 % (ref 0–7)
ERYTHROCYTE [DISTWIDTH] IN BLOOD BY AUTOMATED COUNT: 19.4 % (ref 11.5–14.5)
GLOBULIN SER CALC-MCNC: 2.6 G/DL (ref 2–4)
GLUCOSE SERPL-MCNC: 99 MG/DL (ref 65–100)
HCT VFR BLD AUTO: 35.2 % (ref 35–47)
HGB BLD-MCNC: 10.8 G/DL (ref 11.5–16)
IMM GRANULOCYTES # BLD AUTO: 0 K/UL (ref 0–0.04)
IMM GRANULOCYTES NFR BLD AUTO: 0 % (ref 0–0.5)
LYMPHOCYTES # BLD: 3.9 K/UL (ref 0.8–3.5)
LYMPHOCYTES NFR BLD: 43 % (ref 12–49)
MCH RBC QN AUTO: 25.2 PG (ref 26–34)
MCHC RBC AUTO-ENTMCNC: 30.7 G/DL (ref 30–36.5)
MCV RBC AUTO: 82.1 FL (ref 80–99)
MONOCYTES # BLD: 0.9 K/UL (ref 0–1)
MONOCYTES NFR BLD: 9 % (ref 5–13)
NEUTS SEG # BLD: 3.9 K/UL (ref 1.8–8)
NEUTS SEG NFR BLD: 42 % (ref 32–75)
NRBC # BLD: 0 K/UL (ref 0–0.01)
NRBC BLD-RTO: 0 PER 100 WBC
PLATELET # BLD AUTO: 337 K/UL (ref 150–400)
PMV BLD AUTO: 11.1 FL (ref 8.9–12.9)
POTASSIUM SERPL-SCNC: 4.3 MMOL/L (ref 3.5–5.1)
PROT SERPL-MCNC: 6.6 G/DL (ref 6.4–8.3)
RBC # BLD AUTO: 4.29 M/UL (ref 3.8–5.2)
SAMPLES BEING HELD,HOLD: NORMAL
SODIUM SERPL-SCNC: 139 MMOL/L (ref 136–145)
WBC # BLD AUTO: 9.2 K/UL (ref 3.6–11)

## 2022-07-06 PROCEDURE — 80053 COMPREHEN METABOLIC PANEL: CPT

## 2022-07-06 PROCEDURE — 99283 EMERGENCY DEPT VISIT LOW MDM: CPT

## 2022-07-06 PROCEDURE — 85025 COMPLETE CBC W/AUTO DIFF WBC: CPT

## 2022-07-06 PROCEDURE — 83880 ASSAY OF NATRIURETIC PEPTIDE: CPT

## 2022-07-06 PROCEDURE — 36415 COLL VENOUS BLD VENIPUNCTURE: CPT

## 2022-07-06 NOTE — ED PROVIDER NOTES
Date of Service:  7/6/2022    Patient:  Israel Diaz    Chief Complaint:  Leg Swelling and Ankle swelling       HPI:  Israel Diaz is a 28 y.o.  female who presents for evaluation of leg and ankle swelling. Present since February. On and off not every day. Over the last week they have been getting worse. She describes bilateral leg swelling without discomfort. She states that over the weekend they were red and shiny and her mother got concerned. She arrives here stating that they feel better today than they usually do patient denies posterior calf pain or thigh pain. She does not take birth control has no history of blood clots. Denies chest pain shortness of breath or other acute complaints. She denies alcohol or drug use, excessive exposure to sun           History reviewed. No pertinent past medical history. History reviewed. No pertinent surgical history. Family History:   Problem Relation Age of Onset    Crohn's Disease Mother     Atrial Fibrillation Mother        Social History     Socioeconomic History    Marital status: SINGLE     Spouse name: Not on file    Number of children: Not on file    Years of education: Not on file    Highest education level: Not on file   Occupational History    Not on file   Tobacco Use    Smoking status: Current Every Day Smoker     Packs/day: 0.50    Smokeless tobacco: Never Used   Vaping Use    Vaping Use: Never used   Substance and Sexual Activity    Alcohol use:  Yes     Alcohol/week: 6.0 standard drinks     Types: 6 Cans of beer per week    Drug use: Yes     Types: Marijuana    Sexual activity: Yes   Other Topics Concern    Not on file   Social History Narrative    Not on file     Social Determinants of Health     Financial Resource Strain:     Difficulty of Paying Living Expenses: Not on file   Food Insecurity:     Worried About Running Out of Food in the Last Year: Not on file    Radha of Food in the Last Year: Not on file Transportation Needs:     Lack of Transportation (Medical): Not on file    Lack of Transportation (Non-Medical): Not on file   Physical Activity:     Days of Exercise per Week: Not on file    Minutes of Exercise per Session: Not on file   Stress:     Feeling of Stress : Not on file   Social Connections:     Frequency of Communication with Friends and Family: Not on file    Frequency of Social Gatherings with Friends and Family: Not on file    Attends Orthodox Services: Not on file    Active Member of [address] [place] [address] [employer] or Organizations: Not on file    Attends Club or Organization Meetings: Not on file    Marital Status: Not on file   Intimate Partner Violence:     Fear of Current or Ex-Partner: Not on file    Emotionally Abused: Not on file    Physically Abused: Not on file    Sexually Abused: Not on file   Housing Stability:     Unable to Pay for Housing in the Last Year: Not on file    Number of [place] in the Last Year: Not on file    Unstable Housing in the Last Year: Not on file         ALLERGIES: Patient has no known allergies. Review of Systems   Cardiovascular: Positive for leg swelling. All other systems reviewed and are negative. Vitals:    07/06/22 1011 07/06/22 1011 07/06/22 1012   BP:  124/72    Pulse:  95    Resp:  16    Temp:  98.3 °F (36.8 °C)    SpO2:   100%   Weight: 55.3 kg (122 lb)     Height: 5' 4\" (1.626 m)              Physical Exam  Vitals and nursing note reviewed. Constitutional:       Appearance: Normal appearance. HENT:      Head: Normocephalic and atraumatic. Nose: Nose normal.      Mouth/Throat:      Mouth: Mucous membranes are moist.   Cardiovascular:      Rate and Rhythm: Normal rate and regular rhythm. Heart sounds: Normal heart sounds. Pulmonary:      Effort: Pulmonary effort is normal.      Breath sounds: Normal breath sounds. Abdominal:      General: Abdomen is flat. Musculoskeletal:         General: No swelling or deformity.  Normal range of motion. Right lower leg: Edema present. Left lower leg: Edema present. Skin:     General: Skin is warm. Capillary Refill: Capillary refill takes less than 2 seconds. Neurological:      Mental Status: She is alert and oriented to person, place, and time. Psychiatric:         Mood and Affect: Mood normal.          MDM        VITAL SIGNS:  No data found. LABS:  No results found for this or any previous visit (from the past 6 hour(s)). IMAGING:  No orders to display         Medications During Visit:  Medications - No data to display      DECISION MAKING:  Huyen Monroe is a 28 y.o. female who comes in as above. Diagnostics as above. Minimal swelling but no concern for DVT. No pain. Peripheral edema from unknown source/cause we will have patient follow-up with PCP      IMPRESSION:  1. Peripheral edema        DISPOSITION:  Discharged      Discharge Medication List as of 7/6/2022 11:29 AM           Follow-up Information     Follow up With Specialties Details Why Contact Info    Jillian Aldana PA-C Physician Assistant Schedule an appointment as soon as possible for a visit   [address] [phone]              The patient is asked to follow-up with their primary care provider in the next several days. They are to call tomorrow for an appointment. The patient is asked to return promptly for any increased concerns or worsening of symptoms. They can return to this emergency department or any other emergency department.       Procedures

## 2022-07-06 NOTE — ED TRIAGE NOTES
Pt ambulatory into ER with cc of RLE ankle and leg swelling that began \"months ago. \" Pt reports swelling first began in February. Upon arrival, RLE is same color as LLE. Pt also reports outstanding order for Chest CT for \"spot on my lung that they think is bronchitis. \" Pt reports recently being prescribed ABX for lung finding.

## 2022-07-14 LAB
ALBUMIN SERPL-MCNC: 4.3 G/DL (ref 3.8–4.8)
ALBUMIN/GLOB SERPL: 1.7 {RATIO} (ref 1.2–2.2)
ALP SERPL-CCNC: 108 IU/L (ref 44–121)
ALT SERPL-CCNC: 14 IU/L (ref 0–32)
AST SERPL-CCNC: 13 IU/L (ref 0–40)
BASOPHILS # BLD AUTO: 0.1 X10E3/UL (ref 0–0.2)
BASOPHILS NFR BLD AUTO: 1 %
BILIRUB SERPL-MCNC: <0.2 MG/DL (ref 0–1.2)
BUN SERPL-MCNC: 8 MG/DL (ref 6–20)
BUN/CREAT SERPL: 14 (ref 9–23)
CALCIUM SERPL-MCNC: 9.5 MG/DL (ref 8.7–10.2)
CHLORIDE SERPL-SCNC: 103 MMOL/L (ref 96–106)
CO2 SERPL-SCNC: 23 MMOL/L (ref 20–29)
CREAT SERPL-MCNC: 0.59 MG/DL (ref 0.57–1)
EGFR: 123 ML/MIN/1.73
EOSINOPHIL # BLD AUTO: 0.2 X10E3/UL (ref 0–0.4)
EOSINOPHIL NFR BLD AUTO: 2 %
ERYTHROCYTE [DISTWIDTH] IN BLOOD BY AUTOMATED COUNT: 16.9 % (ref 11.7–15.4)
FERRITIN SERPL-MCNC: 59 NG/ML (ref 15–150)
FOLATE SERPL-MCNC: >20 NG/ML
GLOBULIN SER CALC-MCNC: 2.5 G/DL (ref 1.5–4.5)
GLUCOSE SERPL-MCNC: 87 MG/DL (ref 65–99)
HCT VFR BLD AUTO: 37.5 % (ref 34–46.6)
HCV AB S/CO SERPL IA: <0.1 S/CO RATIO (ref 0–0.9)
HGB BLD-MCNC: 11.6 G/DL (ref 11.1–15.9)
HIV 1+2 AB+HIV1 P24 AG SERPL QL IA: NON REACTIVE
IMM GRANULOCYTES # BLD AUTO: 0 X10E3/UL (ref 0–0.1)
IMM GRANULOCYTES NFR BLD AUTO: 0 %
IRON SATN MFR SERPL: 16 % (ref 15–55)
IRON SERPL-MCNC: 46 UG/DL (ref 27–159)
LYMPHOCYTES # BLD AUTO: 4.2 X10E3/UL (ref 0.7–3.1)
LYMPHOCYTES NFR BLD AUTO: 36 %
MCH RBC QN AUTO: 24.8 PG (ref 26.6–33)
MCHC RBC AUTO-ENTMCNC: 30.9 G/DL (ref 31.5–35.7)
MCV RBC AUTO: 80 FL (ref 79–97)
MONOCYTES # BLD AUTO: 0.9 X10E3/UL (ref 0.1–0.9)
MONOCYTES NFR BLD AUTO: 8 %
NEUTROPHILS # BLD AUTO: 6.3 X10E3/UL (ref 1.4–7)
NEUTROPHILS NFR BLD AUTO: 53 %
PATH INTERP BLD-IMP: ABNORMAL
PATH REV BLD -IMP: ABNORMAL
PATHOLOGIST NAME: ABNORMAL
PLATELET # BLD AUTO: 382 X10E3/UL (ref 150–450)
POTASSIUM SERPL-SCNC: 4.4 MMOL/L (ref 3.5–5.2)
PROT SERPL-MCNC: 6.8 G/DL (ref 6–8.5)
RBC # BLD AUTO: 4.67 X10E6/UL (ref 3.77–5.28)
SODIUM SERPL-SCNC: 144 MMOL/L (ref 134–144)
TIBC SERPL-MCNC: 285 UG/DL (ref 250–450)
UIBC SERPL-MCNC: 239 UG/DL (ref 131–425)
VIT B12 SERPL-MCNC: 574 PG/ML (ref 232–1245)
WBC # BLD AUTO: 11.7 X10E3/UL (ref 3.4–10.8)

## 2022-07-14 NOTE — PROGRESS NOTES
Please call patient to advise  -Her white blood cell count improved significantly but was still slightly high. Her hemoglobin improved. Her platelet count is now normal. Her red blood cells still appear small but also improved. I recommend we repeat cbc in 6 weeks. Her iron levels were normal.   B12 and folate were normal.  Her kidney, liver and electrolytes were normal!    Hep C and HIV testing were negative.

## 2022-07-15 NOTE — PROGRESS NOTES
Pt is aware of labs and verbalized understanding. Her apt has been set. Pt has concerns with her legs. Pt states she wants to have an order put in to have testing done for a blood clot. Pt states she has soreness in her legs, red, itching and warm to touch.  Encouraged her to go back to be evaluated

## 2022-08-10 ENCOUNTER — TRANSCRIBE ORDER (OUTPATIENT)
Dept: GENERAL RADIOLOGY | Age: 32
End: 2022-08-10

## 2022-08-10 ENCOUNTER — HOSPITAL ENCOUNTER (OUTPATIENT)
Dept: GENERAL RADIOLOGY | Age: 32
Discharge: HOME | End: 2022-08-10
Payer: COMMERCIAL

## 2022-08-10 DIAGNOSIS — J98.4 CAVITARY PNEUMONIA: ICD-10-CM

## 2022-08-10 DIAGNOSIS — J98.4 DISEASE OF LUNG: ICD-10-CM

## 2022-08-10 DIAGNOSIS — J18.9 CAVITARY PNEUMONIA: ICD-10-CM

## 2022-08-10 DIAGNOSIS — J18.9 CAVITARY PNEUMONIA: Primary | ICD-10-CM

## 2022-08-10 DIAGNOSIS — J18.9 PNEUMONIA: Primary | ICD-10-CM

## 2022-08-10 DIAGNOSIS — J98.4 CAVITARY PNEUMONIA: Primary | ICD-10-CM

## 2022-08-10 PROCEDURE — 71046 X-RAY EXAM CHEST 2 VIEWS: CPT

## 2022-08-12 ENCOUNTER — TRANSCRIBE ORDER (OUTPATIENT)
Dept: SCHEDULING | Age: 32
End: 2022-08-12

## 2022-08-12 DIAGNOSIS — J18.9 CAVITARY PNEUMONIA: Primary | ICD-10-CM

## 2022-08-12 DIAGNOSIS — J98.4 CAVITARY PNEUMONIA: Primary | ICD-10-CM

## 2022-08-22 ENCOUNTER — OFFICE VISIT (OUTPATIENT)
Dept: FAMILY MEDICINE CLINIC | Age: 32
End: 2022-08-22
Payer: COMMERCIAL

## 2022-08-22 VITALS
DIASTOLIC BLOOD PRESSURE: 62 MMHG | SYSTOLIC BLOOD PRESSURE: 102 MMHG | OXYGEN SATURATION: 97 % | RESPIRATION RATE: 18 BRPM | BODY MASS INDEX: 21.85 KG/M2 | TEMPERATURE: 98.3 F | WEIGHT: 128 LBS | HEIGHT: 64 IN | HEART RATE: 94 BPM

## 2022-08-22 DIAGNOSIS — E55.9 VITAMIN D DEFICIENCY: ICD-10-CM

## 2022-08-22 DIAGNOSIS — R91.1 LESION OF LUNG: ICD-10-CM

## 2022-08-22 DIAGNOSIS — G56.22 NEURITIS OF LEFT ULNAR NERVE: ICD-10-CM

## 2022-08-22 DIAGNOSIS — F41.9 ANXIETY: Primary | ICD-10-CM

## 2022-08-22 DIAGNOSIS — R60.0 LOWER EXTREMITY EDEMA: ICD-10-CM

## 2022-08-22 PROCEDURE — 99214 OFFICE O/P EST MOD 30 MIN: CPT | Performed by: FAMILY MEDICINE

## 2022-08-22 RX ORDER — BISMUTH SUBSALICYLATE 262 MG
1 TABLET,CHEWABLE ORAL DAILY
COMMUNITY

## 2022-08-22 NOTE — PROGRESS NOTES
Oscar You (: 1990) is a 28 y.o. female, established patient, here for evaluation of the following chief complaint(s):  Follow-up (Has been doing fine since last seen. Lexapro has been helping. ) and Numbness (Experiencing numbness and tingling in ring and pinky finger on (L) hand. )         ASSESSMENT/PLAN:  Below is the assessment and plan developed based on review of pertinent history, physical exam, labs, studies, and medications. 1. Anxiety  Significant improvement Lexapro. Continue Rx    2. Neuritis of left ulnar nerve  Symptomatic conservative treatment for now. Encouraged her not to rest her elbow on a hard surface, wrap left elbow in a towel when she sleeps at night to keep it in extension. If symptoms persist will refer to neuro. 3. Lesion of lung  Following with pulm Dr. Tommy Fontana, planned repeat CT in the next several weeks. She spoke with health department 2 weeks ago regarding AFB sputum cultures and had not yet returned but should hear back within the next 2 weeks.  -White blood cell count had returned to normal with CBC at ED visit 2022.    4. Vitamin D deficiency  Lab Results   Component Value Date/Time    Vitamin D 25-Hydroxy 15.5 (L) 2022 02:13 PM     Continue over-the-counter supplement. We will plan to recheck at follow-up in 2 months. 5. Lower extremity edema  Resolved at this point. Again reviewed with patient went to go to the ED for unilateral lower extremity edema or other symptoms concerning for DVT and she voiced understanding. Return in about 6 weeks (around 10/3/2022) for Vit D deficiency . SUBJECTIVE/OBJECTIVE:  Chief Complaint   Patient presents with    Follow-up     Has been doing fine since last seen. Lexapro has been helping. Numbness     Experiencing numbness and tingling in ring and pinky finger on (L) hand. Patient is a 80-year-old female presenting to the office to follow-up anxiety.   Patient reports overall she has been doing much better. 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 Reports Lexapro is going well. Anxiety is much better controlled, she does not have daily feelings of anxiousness and she is able to leave the house. She is sleeping well at night, 8 to 10 hours per night. No panic attacks. No feelings of depression. No thoughts of harming her self or others. Appetite continues to improve, she is able to eat 3 regular meals per day. She has gained an additional 6 pounds in the last 2 months. She denies any nausea or vomiting or diarrhea with Lexapro. She continues to follow with Dr. Jose Eduardo Miguel for cavitary lung lesion. She just had a repeat x-ray last week and will have a repeat CT in the next few weeks, follow-up with him early September. She has not yet heard back from the health department regarding the results of the TB sputum cultures. She should hear back within the next 2 weeks and will continue to call them for results. She denies any cough, shortness of breath, fever, chills. Overall she is feeling significantly improved. She has had no recurrence of lower extremity edema in the last 6 weeks. She has been taking a Vit D supplement with 250IU every day and also taking a one a day multivitamin. For acute complaints, complains of numbness in her left hand felt in her pinky and the medial half of her fourth finger. This started after she took a nap with her hands crossed behind her head for several hours. She woke up and felt tingling in her left elbow that extended down to her pinky finger and part of her left fourth finger. Reports this is been present for the last 2 to 3 weeks, and improving day by day. She has no weakness in this area. Sensation feels reduced and there is some tingling occasionally particularly when she rests her elbow on a hard surface but there is no muscle weakness or complete numbness.     Review of Systems   Constitutional: Negative for chills, fatigue and fever. HENT:  Negative for hearing loss. Eyes:  Negative for pain and visual disturbance. Respiratory:  Negative for cough, chest tightness, shortness of breath and wheezing. Cardiovascular:  Negative for chest pain, palpitations and leg swelling. Gastrointestinal:  Negative for abdominal distention, abdominal pain, constipation, diarrhea, nausea and vomiting. Genitourinary:  Negative for dysuria, flank pain, frequency and hematuria. Musculoskeletal:  Negative for arthralgias, joint swelling and myalgias. Skin:  Negative for rash. Neurological:  Negative for dizziness, weakness, light-headedness and headaches. Psychiatric/Behavioral:  Negative for behavioral problems. The patient is not nervous/anxious. Current Outpatient Medications on File Prior to Visit   Medication Sig Dispense Refill    ergocalciferol, vitamin D2, (VITAMIN D2 PO) Take  by mouth.      multivitamin (ONE A DAY) tablet Take 1 Tablet by mouth daily. escitalopram oxalate (LEXAPRO) 10 mg tablet Take 1 Tablet by mouth daily. 30 Tablet 1     No current facility-administered medications on file prior to visit. Patient Active Problem List    Diagnosis Date Noted    Breast abscess 08/20/2019    SIRS (systemic inflammatory response syndrome) ([facility] [address] 08/20/2019     No Known Allergies  History reviewed. No pertinent surgical history. Social History     Tobacco Use    Smoking status: Every Day     Packs/day: 0.50     Types: Cigarettes    Smokeless tobacco: Never   Vaping Use    Vaping Use: Never used   Substance Use Topics    Alcohol use:  Yes     Alcohol/week: 6.0 standard drinks     Types: 6 Cans of beer per week    Drug use: Yes     Types: Marijuana     Vitals:    08/22/22 0910   BP: 102/62   Pulse: 94   Resp: 18   Temp: 98.3 °F (36.8 °C)   TempSrc: Oral   SpO2: 97%   Weight: 128 lb (58.1 kg)   Height: 5' 4\" (1.626 m)   PainSc:   0 - No pain   LMP: 08/11/2022        Physical Exam  Constitutional:       Appearance: Normal appearance. HENT:      Head: Normocephalic and atraumatic. Eyes:      Extraocular Movements: Extraocular movements intact. Conjunctiva/sclera: Conjunctivae normal.      Pupils: Pupils are equal, round, and reactive to light. Cardiovascular:      Rate and Rhythm: Normal rate and regular rhythm. Pulses: Normal pulses. Heart sounds: Normal heart sounds. Pulmonary:      Effort: Pulmonary effort is normal.      Breath sounds: Normal breath sounds. Abdominal:      General: Abdomen is flat. Bowel sounds are normal.      Palpations: Abdomen is soft. Musculoskeletal:      Right lower leg: No edema. Left lower leg: No edema. Comments: Positive ulnar Tinel's sign left elbow. Negative Tinel's sign median nerve left hand. There is no muscle atrophy in the left hand. Strength is 5 out of 5 throughout bilateral upper extremities.  strength 5 out of 5 bilateral upper extremities. Sensation is intact to both sharp and light touch bilateral upper extremities. There is no reduced sensation in left pinky or left fourth finger. Neurological:      Mental Status: She is alert. Psychiatric:         Mood and Affect: Mood normal.         Behavior: Behavior normal.         An electronic signature was used to authenticate this note.   -- Elizabeth Quinn PA-C

## 2022-08-22 NOTE — PROGRESS NOTES
Garret Madrigal is a 28 y.o. female , id x 2(name and ). Reviewed record, history, and  medications. Chief Complaint   Patient presents with    Follow-up     Has been doing fine since last seen. Lexapro has been helping. Numbness     Experiencing numbness and tingling in ring and pinky finger on (L) hand. Vitals:    22 0910   BP: 102/62   Pulse: 94   Resp: 18   Temp: 98.3 °F (36.8 °C)   TempSrc: Oral   SpO2: 97%   Weight: 128 lb (58.1 kg)   Height: 5' 4\" (1.626 m)       Coordination of Care Questionnaire:   1. Have you been to the ER, urgent care clinic since your last visit? Hospitalized since your last visit? No    2. Have you seen or consulted any other health care providers outside of the [address] [place] [address] since your last visit? Include any pap smears or colon screening. No      3 most recent PHQ Screens 2022   Little interest or pleasure in doing things Not at all   Feeling down, depressed, irritable, or hopeless Not at all   Total Score PHQ 2 0       Patient is accompanied by self I have received verbal consent from Garret Madrigal to discuss any/all medical information while they are present in the room.

## 2022-08-22 NOTE — PATIENT INSTRUCTIONS
Patients should avoid leaning on the elbows when seated or driving and should avoid prolonged elbow flexion. Practical suggestions include using the other hand or a headset when on the telephone and avoiding sitting with the arms crossed. You can also try wrapping your left elbow in a towel at night to limit flexion.

## 2022-09-22 ENCOUNTER — HOSPITAL ENCOUNTER (OUTPATIENT)
Dept: CT IMAGING | Age: 32
Discharge: HOME | End: 2022-09-22
Attending: INTERNAL MEDICINE
Payer: COMMERCIAL

## 2022-09-22 DIAGNOSIS — J18.9 CAVITARY PNEUMONIA: ICD-10-CM

## 2022-09-22 DIAGNOSIS — J98.4 CAVITARY PNEUMONIA: ICD-10-CM

## 2022-09-22 PROCEDURE — 71250 CT THORAX DX C-: CPT

## 2023-04-21 DIAGNOSIS — J18.9 CAVITARY PNEUMONIA: Primary | ICD-10-CM

## 2023-04-21 DIAGNOSIS — J98.4 DISEASE OF LUNG: Primary | ICD-10-CM

## 2023-04-21 DIAGNOSIS — J98.4 CAVITARY PNEUMONIA: Primary | ICD-10-CM
